# Patient Record
Sex: FEMALE | Race: WHITE | Employment: OTHER | URBAN - METROPOLITAN AREA
[De-identification: names, ages, dates, MRNs, and addresses within clinical notes are randomized per-mention and may not be internally consistent; named-entity substitution may affect disease eponyms.]

---

## 2017-11-30 ENCOUNTER — HOSPITAL ENCOUNTER (OUTPATIENT)
Dept: PHYSICAL THERAPY | Facility: CLINIC | Age: 62
Setting detail: THERAPIES SERIES
End: 2017-11-30
Attending: ORTHOPAEDIC SURGERY
Payer: COMMERCIAL

## 2017-11-30 PROCEDURE — 40000718 ZZHC STATISTIC PT DEPARTMENT ORTHO VISIT: Performed by: PHYSICAL THERAPIST

## 2017-11-30 PROCEDURE — 97140 MANUAL THERAPY 1/> REGIONS: CPT | Mod: GP | Performed by: PHYSICAL THERAPIST

## 2017-11-30 PROCEDURE — 97161 PT EVAL LOW COMPLEX 20 MIN: CPT | Mod: GP | Performed by: PHYSICAL THERAPIST

## 2017-11-30 NOTE — PROGRESS NOTES
PHYSICAL THERAPY INITIAL EVALUATION  11/30/17 1400   General Information   Type of Visit Initial OP Ortho PT Evaluation   Start of Care Date 11/30/17   Referring Physician Dr. Silva    Patient/Family Goals Statement being able to freely bring her right leg up    Orders Evaluate and Treat   Orders Comment isometric core strengthening for low back pain. 12 visits.    Date of Order 11/15/17   Insurance Type Private   Insurance Comments/Visits Authorized [employer]   Medical Diagnosis right trochanteric bursitis and back pain   Surgical/Medical history reviewed Yes   Precautions/Limitations no known precautions/limitations   Body Part(s)   Body Part(s) Lumbar Spine/SI   Presentation and Etiology   Pertinent history of current problem (include personal factors and/or comorbidities that impact the POC) Patient reports maybe 4-5 years ago had bent backwards and twisted and felt a pop. Took about a year to get better. Maybe 2 years ago she was bending over weeding repetively and ended up with sciatica really bad. Recently, when doing yoga and she can't lift the right leg up high, same as when trying to horseback ride,can't lay on R side. Had injection into the lumbar spine a few weeks ago.    Impairments A. Pain;E. Decreased flexibility;G. Impaired balance;K. Numbness;L. Tingling;M. Locking or catching   Functional Limitations perform activities of daily living;perform required work activities;perform desired leisure / sports activities   Symptom Location R lateral hip, central low back   How/Where did it occur With repetition/overuse   Onset date of current episode/exacerbation 11/15/17   Chronicity Recurrent   Pain rating (0-10 point scale) Best (/10);Worst (/10)   Best (/10) 3   Worst (/10) 5   Pain quality A. Sharp   Frequency of pain/symptoms A. Constant   Pain/symptoms exacerbated by A. Sitting;B. Walking;G. Certain positions   Pain/symptoms eased by C. Rest;E. Changing positions;H. Cold;I. OTC medication(s)  "  Progression of symptoms since onset: Unchanged   Prior Level of Function   Prior Level of Function-Mobility independent   Prior Level of Function-ADLs independent   Current Level of Function   Patient role/employment history A. 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 No   Have you fallen and had an injury in the past year? No   Is patient a fall risk? No   System Outcome Measures   Outcome Measures Low Back Pain (see Oswestry and Ravindra)   Lumbar Spine/SI Objective Findings   Gait/Locomotion antalgic   Flexion ROM fingertips to mid shin with pulling B back   Extension ROM 25%, \"felt god\"   Right Side Bending ROM Fingeritps to superior pole of patella, pulling mid back   Left Side Bending ROM Fingeritps to superior pole of patella, pulling mid back   Pelvic Screen + supine to sit, - SI provocation testing   Hip Screen Hip ROM WNL, FADIR R caused lateral hip pain, - JOSE JUAN, scour caused pain in posterior buttock   Hip Flexion (L2) Strength R 4-/5 pain lateral hip, L 5/5      Hip Abduction Strength R 4-/5 pain lateral hip L 4/5   Knee Flexion Strength 5/5   Knee Extension (L3) Strength 5/5   Ankle Dorsiflexion (L4) Strength 5/5   Great Toe Extension (L5) Strength 5/5   Ankle Plantar Flexion (S1) Strength 5/5   Hamstring Flexibility mod tight B   Hip Flexor Flexibility mild tight B   Quadricep Flexibility mod tight R   Obers Flexibility mod tight R   Piriformis Flexibility mod tight R   SLR -   Dave Test -   Ely Test + R   Segmental Mobility hypomobile mid thoracic and lower lumbar   Sensation Testing normal    Patellar Tendon Reflexes  2+   Achilles Tendon Reflexes 2+   Palpation very tender posterior greater trochanter, tender glut med and min tendons   Planned Therapy Interventions   Planned Therapy Interventions joint mobilization;manual " therapy;neuromuscular re-education;ROM;strengthening;stretching;balance training   Clinical Impression   Criteria for Skilled Therapeutic Interventions Met yes, treatment indicated   PT Diagnosis R hip pain, LBP   Influenced by the following impairments pain, decreased flexibility, decreased strength   Functional limitations due to impairments laying on R side, prolonged sitting, lifting R leg into car    Clinical Presentation Stable/Uncomplicated   Clinical Presentation Rationale symptoms unchanged   Clinical Decision Making (Complexity) Low complexity   Therapy Frequency 1 time/week   Predicted Duration of Therapy Intervention (days/wks) 8 weeks   Risk & Benefits of therapy have been explained Yes   Patient, Family & other staff in agreement with plan of care Yes   Clinical Impression Comments Patient presenting with signs and symptoms consistent with R greater trochanteric bursitis and mechanical back pain.   Education Assessment   Preferred Learning Style Listening;Demonstration;Pictures/video   Barriers to Learning No barriers   ORTHO GOALS   PT Ortho Eval Goals 1;2;3;4   Ortho Goal 1   Goal Identifier 1   Goal Description Patinet will be able to get in and out of car with minimal pain or difficulty.   Target Date 01/25/18   Ortho Goal 2   Goal Identifier 2   Goal Description Patient will be able to lay on R side with minimal pain.   Target Date 01/25/18   Ortho Goal 3   Goal Identifier 3   Goal Description Patient will be able to sit > 1 hour with minimal pain.   Target Date 01/25/18   Ortho Goal 4   Goal Identifier 4   Goal Description Patient will be independent and compliant with HEP to aid functional recovery.   Target Date 01/25/18   Total Evaluation Time   Total Evaluation Time 20       Please contact me with any questions or concerns.  Thank you for your referral.    Nayely Valencia, PT, DPT, OCS  Physical Therapist, Orthopedic Certified Specialist  [facility]  [facility]  [phone]

## 2017-12-02 ENCOUNTER — HEALTH MAINTENANCE LETTER (OUTPATIENT)
Age: 62
End: 2017-12-02

## 2017-12-07 ENCOUNTER — HOSPITAL ENCOUNTER (OUTPATIENT)
Dept: MAMMOGRAPHY | Facility: CLINIC | Age: 62
Discharge: HOME | End: 2017-12-07
Attending: FAMILY MEDICINE | Admitting: FAMILY MEDICINE
Payer: COMMERCIAL

## 2017-12-07 ENCOUNTER — HOSPITAL ENCOUNTER (OUTPATIENT)
Dept: PHYSICAL THERAPY | Facility: CLINIC | Age: 62
Setting detail: THERAPIES SERIES
End: 2017-12-07
Attending: ORTHOPAEDIC SURGERY
Payer: COMMERCIAL

## 2017-12-07 DIAGNOSIS — R92.8 ABNORMAL MAMMOGRAM: ICD-10-CM

## 2017-12-07 PROCEDURE — 97110 THERAPEUTIC EXERCISES: CPT | Mod: GP | Performed by: PHYSICAL THERAPIST

## 2017-12-07 PROCEDURE — 97140 MANUAL THERAPY 1/> REGIONS: CPT | Mod: GP | Performed by: PHYSICAL THERAPIST

## 2017-12-07 PROCEDURE — G0279 TOMOSYNTHESIS, MAMMO: HCPCS

## 2017-12-07 PROCEDURE — 40000718 ZZHC STATISTIC PT DEPARTMENT ORTHO VISIT: Performed by: PHYSICAL THERAPIST

## 2017-12-14 ENCOUNTER — HOSPITAL ENCOUNTER (OUTPATIENT)
Dept: PHYSICAL THERAPY | Facility: CLINIC | Age: 62
Setting detail: THERAPIES SERIES
End: 2017-12-14
Attending: ORTHOPAEDIC SURGERY
Payer: COMMERCIAL

## 2017-12-14 PROCEDURE — 40000718 ZZHC STATISTIC PT DEPARTMENT ORTHO VISIT: Performed by: PHYSICAL THERAPIST

## 2017-12-14 PROCEDURE — 97140 MANUAL THERAPY 1/> REGIONS: CPT | Mod: GP | Performed by: PHYSICAL THERAPIST

## 2017-12-14 PROCEDURE — 97110 THERAPEUTIC EXERCISES: CPT | Mod: GP | Performed by: PHYSICAL THERAPIST

## 2017-12-21 ENCOUNTER — HOSPITAL ENCOUNTER (OUTPATIENT)
Dept: PHYSICAL THERAPY | Facility: CLINIC | Age: 62
Setting detail: THERAPIES SERIES
End: 2017-12-21
Attending: ORTHOPAEDIC SURGERY
Payer: COMMERCIAL

## 2017-12-21 PROCEDURE — 97140 MANUAL THERAPY 1/> REGIONS: CPT | Mod: GP | Performed by: PHYSICAL THERAPIST

## 2017-12-21 PROCEDURE — 97110 THERAPEUTIC EXERCISES: CPT | Mod: GP | Performed by: PHYSICAL THERAPIST

## 2017-12-21 PROCEDURE — 40000718 ZZHC STATISTIC PT DEPARTMENT ORTHO VISIT: Performed by: PHYSICAL THERAPIST

## 2017-12-21 NOTE — ADDENDUM NOTE
Encounter addended by: Nayely Valencia PT on: 12/21/2017 10:14 AM<BR>     Actions taken: Flowsheet accepted

## 2017-12-27 ENCOUNTER — HOSPITAL ENCOUNTER (OUTPATIENT)
Dept: PHYSICAL THERAPY | Facility: CLINIC | Age: 62
Setting detail: THERAPIES SERIES
End: 2017-12-27
Attending: ORTHOPAEDIC SURGERY
Payer: COMMERCIAL

## 2017-12-27 PROCEDURE — 40000718 ZZHC STATISTIC PT DEPARTMENT ORTHO VISIT: Performed by: PHYSICAL THERAPIST

## 2017-12-27 PROCEDURE — 97110 THERAPEUTIC EXERCISES: CPT | Mod: GP | Performed by: PHYSICAL THERAPIST

## 2017-12-27 PROCEDURE — 97140 MANUAL THERAPY 1/> REGIONS: CPT | Mod: GP | Performed by: PHYSICAL THERAPIST

## 2018-01-03 ENCOUNTER — HOSPITAL ENCOUNTER (OUTPATIENT)
Dept: PHYSICAL THERAPY | Facility: CLINIC | Age: 63
Setting detail: THERAPIES SERIES
End: 2018-01-03
Attending: ORTHOPAEDIC SURGERY
Payer: COMMERCIAL

## 2018-01-03 PROCEDURE — 40000718 ZZHC STATISTIC PT DEPARTMENT ORTHO VISIT: Performed by: PHYSICAL THERAPIST

## 2018-01-03 PROCEDURE — 97140 MANUAL THERAPY 1/> REGIONS: CPT | Mod: GP | Performed by: PHYSICAL THERAPIST

## 2018-01-10 ENCOUNTER — HOSPITAL ENCOUNTER (OUTPATIENT)
Dept: PHYSICAL THERAPY | Facility: CLINIC | Age: 63
Setting detail: THERAPIES SERIES
End: 2018-01-10
Attending: ORTHOPAEDIC SURGERY
Payer: COMMERCIAL

## 2018-01-10 PROCEDURE — 40000718 ZZHC STATISTIC PT DEPARTMENT ORTHO VISIT: Performed by: PHYSICAL THERAPIST

## 2018-01-10 PROCEDURE — 97140 MANUAL THERAPY 1/> REGIONS: CPT | Mod: GP | Performed by: PHYSICAL THERAPIST

## 2018-01-18 ENCOUNTER — HOSPITAL ENCOUNTER (OUTPATIENT)
Dept: PHYSICAL THERAPY | Facility: CLINIC | Age: 63
Setting detail: THERAPIES SERIES
End: 2018-01-18
Attending: ORTHOPAEDIC SURGERY
Payer: COMMERCIAL

## 2018-01-18 PROCEDURE — 40000718 ZZHC STATISTIC PT DEPARTMENT ORTHO VISIT

## 2018-01-18 PROCEDURE — 97140 MANUAL THERAPY 1/> REGIONS: CPT | Mod: GP

## 2018-01-26 ENCOUNTER — HOSPITAL ENCOUNTER (OUTPATIENT)
Dept: PHYSICAL THERAPY | Facility: CLINIC | Age: 63
Setting detail: THERAPIES SERIES
End: 2018-01-26
Attending: ORTHOPAEDIC SURGERY
Payer: COMMERCIAL

## 2018-01-26 PROCEDURE — 97140 MANUAL THERAPY 1/> REGIONS: CPT | Mod: GP | Performed by: PHYSICAL THERAPIST

## 2018-01-26 PROCEDURE — 40000718 ZZHC STATISTIC PT DEPARTMENT ORTHO VISIT: Performed by: PHYSICAL THERAPIST

## 2018-02-01 ENCOUNTER — HOSPITAL ENCOUNTER (OUTPATIENT)
Dept: PHYSICAL THERAPY | Facility: CLINIC | Age: 63
Setting detail: THERAPIES SERIES
End: 2018-02-01
Attending: ORTHOPAEDIC SURGERY
Payer: COMMERCIAL

## 2018-02-01 PROCEDURE — 97110 THERAPEUTIC EXERCISES: CPT | Mod: GP | Performed by: PHYSICAL THERAPIST

## 2018-02-01 PROCEDURE — 40000718 ZZHC STATISTIC PT DEPARTMENT ORTHO VISIT: Performed by: PHYSICAL THERAPIST

## 2018-02-01 PROCEDURE — 97140 MANUAL THERAPY 1/> REGIONS: CPT | Mod: GP | Performed by: PHYSICAL THERAPIST

## 2018-02-01 NOTE — PROGRESS NOTES
PHYSICAL THERAPY PROGRESS NOTE  02/01/18 0900   Signing Clinician's Name / Credentials   Signing clinician's name / credentials Nayely Valencia, PT, DPT, OCS   Session Number   Session Number 10 medica   Progress Note/Recertification   Progress Note Due Date 03/29/18   Adult Goals   PT Ortho Eval Goals 1;2;3;4   Ortho Goal 1   Goal Identifier 1   Goal Description Patinet will be able to get in and out of car with minimal pain or difficulty.   Target Date 01/25/18   Date Met 01/18/18   Ortho Goal 2   Goal Identifier 2   Goal Description Patient will be able to lay on R side with minimal pain.   Target Date 01/25/18   Date Met 01/18/18   Ortho Goal 3   Goal Identifier 3   Goal Description Patient will be able to sit > 1 hour with minimal pain.   Target Date 03/29/18   Date Met (up to an hour)   Ortho Goal 4   Goal Identifier 4   Goal Description Patient will be independent and compliant with HEP to aid functional recovery.   Target Date 03/29/18   Date Met (compliant, continuing to progress)   Subjective Report   Subjective Report Patient reports she continues to slowly improve.    Objective Measures   Objective Measures Objective Measure 1   Objective Measure 1   Objective Measure Palpation   Details B glute tendons minimally tender now    Treatment Interventions   Interventions Therapeutic Procedure/Exercise;Manual Therapy   Therapeutic Procedure/exercise   Minutes 8   Skilled Intervention stretching to decrease pain and improve function   Patient Response felt good   Treatment Detail re-instr pt in performing SL reach and roll for thoracic mobility x 10 B and hookling lower trunk rotation stretch B   Manual Therapy   Minutes 22   Skilled Intervention MT to improve mobility and decrease pain    Patient Response less tender glute tendons B, still very tight and tender piriformis, stiff TL junction   Treatment Detail CFM glute med/min tendons B, TPR piriformis B. MET ERS L L1, prone PA T11-L1 grade III     Assessments Completed   Assessments Completed The patient's hip pain and significantly improved and she no longer has issues raising her leg up into the car on laying on her hips. Her main compliant now continues to be her back pain, especially with sitting. She would continue to benefit from skilled PT to focus more on her back pain.   Education   Learner Patient   Readiness Acceptance   Method Booklet/handout;Explanation;Demonstration   Response Verbalizes Understanding;Demonstrates Understanding   Plan   Home program above ex   Updates to plan of care 1x/week for 4 weeks, 1x every other week for 4 weeks   Plan for next session focus on back (TL junction), core strength   Total Session Time   Timed Code Treatment Minutes 30   Total Treatment Time (sum of timed and untimed services) 30, te mt       Please contact me with any questions or concerns.  Thank you for your referral.    Nayely Valencia, PT, DPT, OCS  Physical Therapist, Orthopedic Certified Specialist  [facility] - [facility]  [phone]

## 2018-02-08 ENCOUNTER — HOSPITAL ENCOUNTER (OUTPATIENT)
Dept: PHYSICAL THERAPY | Facility: CLINIC | Age: 63
Setting detail: THERAPIES SERIES
End: 2018-02-08
Attending: ORTHOPAEDIC SURGERY
Payer: COMMERCIAL

## 2018-02-08 PROCEDURE — 97140 MANUAL THERAPY 1/> REGIONS: CPT | Mod: GP | Performed by: PHYSICAL THERAPIST

## 2018-02-08 PROCEDURE — 40000718 ZZHC STATISTIC PT DEPARTMENT ORTHO VISIT: Performed by: PHYSICAL THERAPIST

## 2018-02-16 ENCOUNTER — HOSPITAL ENCOUNTER (OUTPATIENT)
Dept: PHYSICAL THERAPY | Facility: CLINIC | Age: 63
Setting detail: THERAPIES SERIES
End: 2018-02-16
Attending: ORTHOPAEDIC SURGERY
Payer: COMMERCIAL

## 2018-02-16 PROCEDURE — 97110 THERAPEUTIC EXERCISES: CPT | Mod: GP | Performed by: PHYSICAL THERAPIST

## 2018-02-16 PROCEDURE — 40000718 ZZHC STATISTIC PT DEPARTMENT ORTHO VISIT: Performed by: PHYSICAL THERAPIST

## 2018-02-16 PROCEDURE — 97140 MANUAL THERAPY 1/> REGIONS: CPT | Mod: GP | Performed by: PHYSICAL THERAPIST

## 2018-02-23 ENCOUNTER — HOSPITAL ENCOUNTER (OUTPATIENT)
Dept: PHYSICAL THERAPY | Facility: CLINIC | Age: 63
Setting detail: THERAPIES SERIES
End: 2018-02-23
Attending: ORTHOPAEDIC SURGERY
Payer: COMMERCIAL

## 2018-02-23 PROCEDURE — 40000718 ZZHC STATISTIC PT DEPARTMENT ORTHO VISIT: Performed by: PHYSICAL THERAPIST

## 2018-02-23 PROCEDURE — 97140 MANUAL THERAPY 1/> REGIONS: CPT | Mod: GP | Performed by: PHYSICAL THERAPIST

## 2018-02-23 PROCEDURE — 97110 THERAPEUTIC EXERCISES: CPT | Mod: GP | Performed by: PHYSICAL THERAPIST

## 2018-07-09 NOTE — PROGRESS NOTES
PHYSICAL THERAPY DISCHARGE NOTE  02/23/18 0900   Signing Clinician's Name / Credentials   Signing clinician's name / credentials Nayely Valencia, PT, DPT, OCS   Session Number   Session Number 13 medica   Progress Note/Recertification   Progress Note Due Date 03/29/18   Adult Goals   PT Ortho Eval Goals 1;2;3;4   Ortho Goal 1   Goal Identifier 1   Goal Description Patinet will be able to get in and out of car with minimal pain or difficulty.   Target Date 01/25/18   Date Met 01/18/18   Ortho Goal 2   Goal Identifier 2   Goal Description Patient will be able to lay on R side with minimal pain.   Target Date 01/25/18   Date Met 01/18/18   Ortho Goal 3   Goal Identifier 3   Goal Description Patient will be able to sit > 1 hour with minimal pain.   Target Date 03/29/18   Date Met (up to an hour)   Ortho Goal 4   Goal Identifier 4   Goal Description Patient will be independent and compliant with HEP to aid functional recovery.   Target Date 03/29/18   Date Met (compliant, continuing to progress)   Subjective Report   Subjective Report Patient reports she feels good as long as she does her exercises.   Objective Measures   Objective Measures Objective Measure 1   Objective Measure 1   Objective Measure Palpation   Treatment Interventions   Interventions Therapeutic Procedure/Exercise;Manual Therapy   Therapeutic Procedure/exercise   Minutes 10   Skilled Intervention strengthening to decrease pain and improve function   Patient Response glutes fatigued out quickly   Treatment Detail hip hike on step with hip abduction (hike-hold hike and kick out leg, bring back-relax) x 20 B, SL clamshell RTB x 20 B,  supported on counter donkey kick x 20 B.   Manual Therapy   Minutes 15   Skilled Intervention MT to improve mobility and decrease pain    Patient Response decreased tissue tension, felt better   Treatment Detail TPR B pirifromis, along iliac crest B for glutes, along sacrum    Education   Learner Patient   Readiness  Acceptance   Method Booklet/handout;Explanation;Demonstration   Response Verbalizes Understanding;Demonstrates Understanding   Plan   Home program above ex   Updates to plan of care pt would like to cont ex on own   Plan for next session hold chart    Total Session Time   Timed Code Treatment Minutes 25   Total Treatment Time (sum of timed and untimed services) 25, te mt       Please contact me with any questions or concerns.  Thank you for your referral.    Nayely Valencia, PT, DPT, OCS  Physical Therapist, Orthopedic Certified Specialist  [facility] - [facility]  [phone]

## 2018-07-09 NOTE — ADDENDUM NOTE
Encounter addended by: Nayely Valencia, PT on: 7/9/2018  1:33 PM<BR>     Actions taken: Sign clinical note, Flowsheet accepted, Episode resolved

## 2019-04-04 ENCOUNTER — HOSPITAL ENCOUNTER (OUTPATIENT)
Dept: MAMMOGRAPHY | Facility: CLINIC | Age: 64
Discharge: HOME | End: 2019-04-04
Attending: FAMILY MEDICINE | Admitting: FAMILY MEDICINE
Payer: COMMERCIAL

## 2019-04-04 DIAGNOSIS — Z12.31 VISIT FOR SCREENING MAMMOGRAM: ICD-10-CM

## 2019-04-04 PROCEDURE — 77063 BREAST TOMOSYNTHESIS BI: CPT

## 2020-08-12 DIAGNOSIS — Z11.59 ENCOUNTER FOR SCREENING FOR OTHER VIRAL DISEASES: Primary | ICD-10-CM

## 2020-08-13 ENCOUNTER — HOSPITAL ENCOUNTER (OUTPATIENT)
Dept: MAMMOGRAPHY | Facility: CLINIC | Age: 65
Discharge: HOME | End: 2020-08-13
Attending: FAMILY MEDICINE | Admitting: FAMILY MEDICINE
Payer: COMMERCIAL

## 2020-08-13 DIAGNOSIS — Z12.31 VISIT FOR SCREENING MAMMOGRAM: ICD-10-CM

## 2020-08-13 PROCEDURE — 77067 SCR MAMMO BI INCL CAD: CPT

## 2020-09-30 ENCOUNTER — ANESTHESIA EVENT (OUTPATIENT)
Dept: SURGERY | Facility: CLINIC | Age: 65
End: 2020-09-30
Payer: COMMERCIAL

## 2020-10-02 ENCOUNTER — AMBULATORY - HEALTHEAST (OUTPATIENT)
Dept: INTERNAL MEDICINE | Facility: CLINIC | Age: 65
End: 2020-10-02

## 2020-10-02 DIAGNOSIS — Z11.59 ENCOUNTER FOR SCREENING FOR OTHER VIRAL DISEASES: ICD-10-CM

## 2020-10-02 RX ORDER — CETIRIZINE HYDROCHLORIDE 10 MG/1
10 TABLET ORAL DAILY
COMMUNITY

## 2020-10-03 ENCOUNTER — AMBULATORY - HEALTHEAST (OUTPATIENT)
Dept: FAMILY MEDICINE | Facility: CLINIC | Age: 65
End: 2020-10-03

## 2020-10-03 DIAGNOSIS — Z11.59 ENCOUNTER FOR SCREENING FOR OTHER VIRAL DISEASES: ICD-10-CM

## 2020-10-05 ENCOUNTER — COMMUNICATION - HEALTHEAST (OUTPATIENT)
Dept: SCHEDULING | Facility: CLINIC | Age: 65
End: 2020-10-05

## 2020-10-06 ENCOUNTER — APPOINTMENT (OUTPATIENT)
Dept: GENERAL RADIOLOGY | Facility: CLINIC | Age: 65
End: 2020-10-06
Attending: ORTHOPAEDIC SURGERY
Payer: COMMERCIAL

## 2020-10-06 ENCOUNTER — HOSPITAL ENCOUNTER (OUTPATIENT)
Facility: CLINIC | Age: 65
LOS: 1 days | Discharge: HOME | End: 2020-10-07
Attending: ORTHOPAEDIC SURGERY | Admitting: ORTHOPAEDIC SURGERY
Payer: COMMERCIAL

## 2020-10-06 ENCOUNTER — ANESTHESIA (OUTPATIENT)
Dept: SURGERY | Facility: CLINIC | Age: 65
End: 2020-10-06
Payer: COMMERCIAL

## 2020-10-06 DIAGNOSIS — Z98.890 S/P LUMBAR LAMINECTOMY: Primary | ICD-10-CM

## 2020-10-06 PROCEDURE — 250N000011 HC RX IP 250 OP 636: Performed by: NURSE ANESTHETIST, CERTIFIED REGISTERED

## 2020-10-06 PROCEDURE — 370N000001 HC ANESTHESIA TECHNICAL FEE, 1ST 30 MIN: Performed by: ORTHOPAEDIC SURGERY

## 2020-10-06 PROCEDURE — 360N000030 HC SURGERY LEVEL 4 W FLUORO 1ST 30 MIN: Performed by: ORTHOPAEDIC SURGERY

## 2020-10-06 PROCEDURE — C1763 CONN TISS, NON-HUMAN: HCPCS | Performed by: ORTHOPAEDIC SURGERY

## 2020-10-06 PROCEDURE — 272N000004 HC RX 272: Performed by: ORTHOPAEDIC SURGERY

## 2020-10-06 PROCEDURE — 258N000003 HC RX IP 258 OP 636: Performed by: NURSE ANESTHETIST, CERTIFIED REGISTERED

## 2020-10-06 PROCEDURE — 250N000009 HC RX 250: Performed by: NURSE ANESTHETIST, CERTIFIED REGISTERED

## 2020-10-06 PROCEDURE — 250N000013 HC RX MED GY IP 250 OP 250 PS 637: Performed by: NURSE ANESTHETIST, CERTIFIED REGISTERED

## 2020-10-06 PROCEDURE — 250N000011 HC RX IP 250 OP 636: Performed by: ORTHOPAEDIC SURGERY

## 2020-10-06 PROCEDURE — 761N000002 HC RECOVERY PHASE 1 LEVEL 1 EA ADDTL HR: Performed by: ORTHOPAEDIC SURGERY

## 2020-10-06 PROCEDURE — 999N000179 XR SURGERY CARM FLUORO LESS THAN 5 MIN W STILLS: Mod: TC

## 2020-10-06 PROCEDURE — 250N000013 HC RX MED GY IP 250 OP 250 PS 637: Performed by: ORTHOPAEDIC SURGERY

## 2020-10-06 PROCEDURE — 761N000001 HC RECOVERY PHASE 1 LEVEL 1 FIRST HR: Performed by: ORTHOPAEDIC SURGERY

## 2020-10-06 PROCEDURE — 272N000001 HC OR GENERAL SUPPLY STERILE: Performed by: ORTHOPAEDIC SURGERY

## 2020-10-06 PROCEDURE — 999N000136 HC STATISTIC PRE PROC ASSESS II: Performed by: ORTHOPAEDIC SURGERY

## 2020-10-06 PROCEDURE — 370N000002 HC ANESTHESIA TECHNICAL FEE, EACH ADDTL 15 MIN: Performed by: ORTHOPAEDIC SURGERY

## 2020-10-06 PROCEDURE — 360N000027 HC SURGERY LEVEL 4 EA 15 ADDTL MIN: Performed by: ORTHOPAEDIC SURGERY

## 2020-10-06 PROCEDURE — 271N000001 HC OR GENERAL SUPPLY NON-STERILE: Performed by: ORTHOPAEDIC SURGERY

## 2020-10-06 DEVICE — GRAFT DURAGEN MATRIX 1"X1": Type: IMPLANTABLE DEVICE | Site: SPINE LUMBAR | Status: FUNCTIONAL

## 2020-10-06 RX ORDER — LEVOTHYROXINE SODIUM 50 UG/1
50 TABLET ORAL ONCE
Status: COMPLETED | OUTPATIENT
Start: 2020-10-07 | End: 2020-10-06

## 2020-10-06 RX ORDER — GABAPENTIN 300 MG/1
300 CAPSULE ORAL 2 TIMES DAILY
Status: DISCONTINUED | OUTPATIENT
Start: 2020-10-06 | End: 2020-10-07

## 2020-10-06 RX ORDER — ESTRADIOL 1 MG/1
1 TABLET ORAL DAILY
Status: DISCONTINUED | OUTPATIENT
Start: 2020-10-06 | End: 2020-10-07 | Stop reason: HOSPADM

## 2020-10-06 RX ORDER — HYDROXYZINE HYDROCHLORIDE 25 MG/1
25 TABLET, FILM COATED ORAL EVERY 6 HOURS PRN
Status: DISCONTINUED | OUTPATIENT
Start: 2020-10-06 | End: 2020-10-07 | Stop reason: HOSPADM

## 2020-10-06 RX ORDER — MEPERIDINE HYDROCHLORIDE 25 MG/ML
12.5 INJECTION INTRAMUSCULAR; INTRAVENOUS; SUBCUTANEOUS EVERY 5 MIN PRN
Status: DISCONTINUED | OUTPATIENT
Start: 2020-10-06 | End: 2020-10-07 | Stop reason: HOSPADM

## 2020-10-06 RX ORDER — HYDROCODONE BITARTRATE AND ACETAMINOPHEN 5; 325 MG/1; MG/1
1-2 TABLET ORAL EVERY 4 HOURS PRN
Status: DISCONTINUED | OUTPATIENT
Start: 2020-10-06 | End: 2020-10-07 | Stop reason: HOSPADM

## 2020-10-06 RX ORDER — GABAPENTIN 300 MG/1
300 CAPSULE ORAL ONCE
Status: COMPLETED | OUTPATIENT
Start: 2020-10-06 | End: 2020-10-06

## 2020-10-06 RX ORDER — ACETAMINOPHEN 325 MG/1
975 TABLET ORAL ONCE
Status: DISCONTINUED | OUTPATIENT
Start: 2020-10-06 | End: 2020-10-06

## 2020-10-06 RX ORDER — TRAMADOL HYDROCHLORIDE 50 MG/1
1 TABLET ORAL DAILY
Status: ON HOLD | COMMUNITY
Start: 2020-08-25 | End: 2020-10-07

## 2020-10-06 RX ORDER — CELECOXIB 200 MG/1
200 CAPSULE ORAL ONCE
Status: COMPLETED | OUTPATIENT
Start: 2020-10-06 | End: 2020-10-06

## 2020-10-06 RX ORDER — CALCIUM CARBONATE 500 MG/1
1000 TABLET, CHEWABLE ORAL 4 TIMES DAILY PRN
Status: DISCONTINUED | OUTPATIENT
Start: 2020-10-06 | End: 2020-10-07 | Stop reason: HOSPADM

## 2020-10-06 RX ORDER — DEXAMETHASONE SODIUM PHOSPHATE 4 MG/ML
INJECTION, SOLUTION INTRA-ARTICULAR; INTRALESIONAL; INTRAMUSCULAR; INTRAVENOUS; SOFT TISSUE PRN
Status: DISCONTINUED | OUTPATIENT
Start: 2020-10-06 | End: 2020-10-06

## 2020-10-06 RX ORDER — NALOXONE HYDROCHLORIDE 0.4 MG/ML
.1-.4 INJECTION, SOLUTION INTRAMUSCULAR; INTRAVENOUS; SUBCUTANEOUS
Status: ACTIVE | OUTPATIENT
Start: 2020-10-06 | End: 2020-10-07

## 2020-10-06 RX ORDER — MAGNESIUM SULFATE HEPTAHYDRATE 40 MG/ML
2 INJECTION, SOLUTION INTRAVENOUS ONCE
Status: COMPLETED | OUTPATIENT
Start: 2020-10-06 | End: 2020-10-06

## 2020-10-06 RX ORDER — CEFAZOLIN SODIUM 1 G/50ML
1 INJECTION, SOLUTION INTRAVENOUS EVERY 8 HOURS
Status: COMPLETED | OUTPATIENT
Start: 2020-10-06 | End: 2020-10-07

## 2020-10-06 RX ORDER — HYDROMORPHONE HYDROCHLORIDE 1 MG/ML
.3-.5 INJECTION, SOLUTION INTRAMUSCULAR; INTRAVENOUS; SUBCUTANEOUS EVERY 5 MIN PRN
Status: DISCONTINUED | OUTPATIENT
Start: 2020-10-06 | End: 2020-10-07 | Stop reason: HOSPADM

## 2020-10-06 RX ORDER — METOCLOPRAMIDE 10 MG/1
10 TABLET ORAL EVERY 6 HOURS PRN
Status: DISCONTINUED | OUTPATIENT
Start: 2020-10-06 | End: 2020-10-07 | Stop reason: HOSPADM

## 2020-10-06 RX ORDER — KETAMINE HYDROCHLORIDE 10 MG/ML
0.25 INJECTION, SOLUTION INTRAMUSCULAR; INTRAVENOUS ONCE
Status: DISCONTINUED | OUTPATIENT
Start: 2020-10-06 | End: 2020-10-06 | Stop reason: HOSPADM

## 2020-10-06 RX ORDER — SODIUM CHLORIDE, SODIUM LACTATE, POTASSIUM CHLORIDE, CALCIUM CHLORIDE 600; 310; 30; 20 MG/100ML; MG/100ML; MG/100ML; MG/100ML
INJECTION, SOLUTION INTRAVENOUS CONTINUOUS
Status: DISCONTINUED | OUTPATIENT
Start: 2020-10-06 | End: 2020-10-06 | Stop reason: HOSPADM

## 2020-10-06 RX ORDER — ACETAMINOPHEN 325 MG/1
975 TABLET ORAL ONCE
Status: COMPLETED | OUTPATIENT
Start: 2020-10-06 | End: 2020-10-06

## 2020-10-06 RX ORDER — SODIUM CHLORIDE, SODIUM LACTATE, POTASSIUM CHLORIDE, CALCIUM CHLORIDE 600; 310; 30; 20 MG/100ML; MG/100ML; MG/100ML; MG/100ML
INJECTION, SOLUTION INTRAVENOUS CONTINUOUS
Status: DISCONTINUED | OUTPATIENT
Start: 2020-10-06 | End: 2020-10-07 | Stop reason: HOSPADM

## 2020-10-06 RX ORDER — HYDROMORPHONE HYDROCHLORIDE 1 MG/ML
0.2 INJECTION, SOLUTION INTRAMUSCULAR; INTRAVENOUS; SUBCUTANEOUS
Status: DISCONTINUED | OUTPATIENT
Start: 2020-10-06 | End: 2020-10-07 | Stop reason: HOSPADM

## 2020-10-06 RX ORDER — PROPOFOL 10 MG/ML
INJECTION, EMULSION INTRAVENOUS CONTINUOUS PRN
Status: DISCONTINUED | OUTPATIENT
Start: 2020-10-06 | End: 2020-10-06

## 2020-10-06 RX ORDER — PROPOFOL 10 MG/ML
INJECTION, EMULSION INTRAVENOUS
Status: COMPLETED | OUTPATIENT
Start: 2020-10-06 | End: 2020-10-06

## 2020-10-06 RX ORDER — CETIRIZINE HYDROCHLORIDE 10 MG/1
10 TABLET ORAL DAILY
Status: DISCONTINUED | OUTPATIENT
Start: 2020-10-07 | End: 2020-10-07 | Stop reason: HOSPADM

## 2020-10-06 RX ORDER — FENTANYL CITRATE 50 UG/ML
INJECTION, SOLUTION INTRAMUSCULAR; INTRAVENOUS PRN
Status: DISCONTINUED | OUTPATIENT
Start: 2020-10-06 | End: 2020-10-06

## 2020-10-06 RX ORDER — HYDRALAZINE HYDROCHLORIDE 20 MG/ML
2.5-5 INJECTION INTRAMUSCULAR; INTRAVENOUS EVERY 10 MIN PRN
Status: DISCONTINUED | OUTPATIENT
Start: 2020-10-06 | End: 2020-10-07 | Stop reason: HOSPADM

## 2020-10-06 RX ORDER — PROPOFOL 10 MG/ML
INJECTION, EMULSION INTRAVENOUS PRN
Status: DISCONTINUED | OUTPATIENT
Start: 2020-10-06 | End: 2020-10-06

## 2020-10-06 RX ORDER — CEFAZOLIN SODIUM 1 G/3ML
1 INJECTION, POWDER, FOR SOLUTION INTRAMUSCULAR; INTRAVENOUS SEE ADMIN INSTRUCTIONS
Status: DISCONTINUED | OUTPATIENT
Start: 2020-10-06 | End: 2020-10-06 | Stop reason: HOSPADM

## 2020-10-06 RX ORDER — BUPIVACAINE HYDROCHLORIDE 2.5 MG/ML
INJECTION, SOLUTION INFILTRATION; PERINEURAL PRN
Status: DISCONTINUED | OUTPATIENT
Start: 2020-10-06 | End: 2020-10-06 | Stop reason: HOSPADM

## 2020-10-06 RX ORDER — LIDOCAINE 40 MG/G
CREAM TOPICAL
Status: DISCONTINUED | OUTPATIENT
Start: 2020-10-06 | End: 2020-10-06 | Stop reason: HOSPADM

## 2020-10-06 RX ORDER — KETOROLAC TROMETHAMINE 30 MG/ML
30 INJECTION, SOLUTION INTRAMUSCULAR; INTRAVENOUS
Status: CANCELLED | OUTPATIENT
Start: 2020-10-06

## 2020-10-06 RX ORDER — PROCHLORPERAZINE MALEATE 5 MG
10 TABLET ORAL EVERY 6 HOURS PRN
Status: DISCONTINUED | OUTPATIENT
Start: 2020-10-06 | End: 2020-10-07 | Stop reason: HOSPADM

## 2020-10-06 RX ORDER — LIDOCAINE 40 MG/G
CREAM TOPICAL
Status: DISCONTINUED | OUTPATIENT
Start: 2020-10-06 | End: 2020-10-07 | Stop reason: HOSPADM

## 2020-10-06 RX ORDER — CEFAZOLIN SODIUM 2 G/100ML
2 INJECTION, SOLUTION INTRAVENOUS
Status: COMPLETED | OUTPATIENT
Start: 2020-10-06 | End: 2020-10-06

## 2020-10-06 RX ORDER — TRAZODONE HYDROCHLORIDE 100 MG/1
2 TABLET ORAL AT BEDTIME
COMMUNITY
Start: 2020-06-18

## 2020-10-06 RX ORDER — KETAMINE HYDROCHLORIDE 10 MG/ML
INJECTION, SOLUTION INTRAMUSCULAR; INTRAVENOUS PRN
Status: DISCONTINUED | OUTPATIENT
Start: 2020-10-06 | End: 2020-10-06

## 2020-10-06 RX ORDER — CITALOPRAM HYDROBROMIDE 40 MG/1
40 TABLET ORAL DAILY
Status: DISCONTINUED | OUTPATIENT
Start: 2020-10-06 | End: 2020-10-07 | Stop reason: HOSPADM

## 2020-10-06 RX ORDER — TRAZODONE HYDROCHLORIDE 50 MG/1
50 TABLET, FILM COATED ORAL
Status: DISCONTINUED | OUTPATIENT
Start: 2020-10-06 | End: 2020-10-07 | Stop reason: HOSPADM

## 2020-10-06 RX ORDER — GLYCOPYRROLATE 0.2 MG/ML
INJECTION, SOLUTION INTRAMUSCULAR; INTRAVENOUS PRN
Status: DISCONTINUED | OUTPATIENT
Start: 2020-10-06 | End: 2020-10-06

## 2020-10-06 RX ORDER — NALOXONE HYDROCHLORIDE 0.4 MG/ML
.1-.4 INJECTION, SOLUTION INTRAMUSCULAR; INTRAVENOUS; SUBCUTANEOUS
Status: DISCONTINUED | OUTPATIENT
Start: 2020-10-06 | End: 2020-10-07 | Stop reason: HOSPADM

## 2020-10-06 RX ORDER — SCOLOPAMINE TRANSDERMAL SYSTEM 1 MG/1
1 PATCH, EXTENDED RELEASE TRANSDERMAL ONCE
Status: DISCONTINUED | OUTPATIENT
Start: 2020-10-06 | End: 2020-10-06 | Stop reason: HOSPADM

## 2020-10-06 RX ORDER — FENTANYL CITRATE 50 UG/ML
25-50 INJECTION, SOLUTION INTRAMUSCULAR; INTRAVENOUS
Status: DISCONTINUED | OUTPATIENT
Start: 2020-10-06 | End: 2020-10-07 | Stop reason: HOSPADM

## 2020-10-06 RX ORDER — ESTRADIOL 10 UG/1
10 INSERT VAGINAL
COMMUNITY
Start: 2019-12-05 | End: 2021-11-03

## 2020-10-06 RX ORDER — METOCLOPRAMIDE HYDROCHLORIDE 5 MG/ML
10 INJECTION INTRAMUSCULAR; INTRAVENOUS EVERY 6 HOURS PRN
Status: DISCONTINUED | OUTPATIENT
Start: 2020-10-06 | End: 2020-10-07 | Stop reason: HOSPADM

## 2020-10-06 RX ADMIN — ROCURONIUM BROMIDE 10 MG: 10 INJECTION INTRAVENOUS at 07:43

## 2020-10-06 RX ADMIN — MIDAZOLAM HYDROCHLORIDE 1 MG: 1 INJECTION, SOLUTION INTRAMUSCULAR; INTRAVENOUS at 09:15

## 2020-10-06 RX ADMIN — SODIUM CHLORIDE, POTASSIUM CHLORIDE, SODIUM LACTATE AND CALCIUM CHLORIDE: 600; 310; 30; 20 INJECTION, SOLUTION INTRAVENOUS at 06:46

## 2020-10-06 RX ADMIN — LEVOTHYROXINE SODIUM 50 MCG: 50 TABLET ORAL at 23:16

## 2020-10-06 RX ADMIN — CEFAZOLIN SODIUM 2 G: 2 INJECTION, SOLUTION INTRAVENOUS at 07:35

## 2020-10-06 RX ADMIN — FENTANYL CITRATE 50 MCG: 50 INJECTION, SOLUTION INTRAMUSCULAR; INTRAVENOUS at 08:06

## 2020-10-06 RX ADMIN — MIDAZOLAM HYDROCHLORIDE 2 MG: 1 INJECTION, SOLUTION INTRAMUSCULAR; INTRAVENOUS at 07:38

## 2020-10-06 RX ADMIN — GABAPENTIN 300 MG: 300 CAPSULE ORAL at 19:46

## 2020-10-06 RX ADMIN — GLYCOPYRROLATE 0.2 MG: 0.2 INJECTION, SOLUTION INTRAMUSCULAR; INTRAVENOUS at 07:42

## 2020-10-06 RX ADMIN — HYDROMORPHONE HYDROCHLORIDE 0.2 MG: 1 INJECTION, SOLUTION INTRAMUSCULAR; INTRAVENOUS; SUBCUTANEOUS at 13:57

## 2020-10-06 RX ADMIN — CEFAZOLIN SODIUM 2 G: 2 INJECTION, SOLUTION INTRAVENOUS at 09:27

## 2020-10-06 RX ADMIN — PROPOFOL 150 MG: 10 INJECTION, EMULSION INTRAVENOUS at 07:41

## 2020-10-06 RX ADMIN — LIDOCAINE HYDROCHLORIDE 50 MG: 10 INJECTION, SOLUTION EPIDURAL; INFILTRATION; INTRACAUDAL; PERINEURAL at 07:40

## 2020-10-06 RX ADMIN — CEFAZOLIN SODIUM 1 G: 1 INJECTION, SOLUTION INTRAVENOUS at 16:54

## 2020-10-06 RX ADMIN — TRAZODONE HYDROCHLORIDE 50 MG: 50 TABLET ORAL at 19:51

## 2020-10-06 RX ADMIN — SODIUM CHLORIDE, POTASSIUM CHLORIDE, SODIUM LACTATE AND CALCIUM CHLORIDE: 600; 310; 30; 20 INJECTION, SOLUTION INTRAVENOUS at 16:44

## 2020-10-06 RX ADMIN — HYDROCODONE BITARTRATE AND ACETAMINOPHEN 2 TABLET: 5; 325 TABLET ORAL at 19:46

## 2020-10-06 RX ADMIN — MAGNESIUM SULFATE 2 G: 2 INJECTION INTRAVENOUS at 06:47

## 2020-10-06 RX ADMIN — HYDROCODONE BITARTRATE AND ACETAMINOPHEN 2 TABLET: 5; 325 TABLET ORAL at 23:17

## 2020-10-06 RX ADMIN — ESTRADIOL 1 MG: 1 TABLET ORAL at 16:53

## 2020-10-06 RX ADMIN — ACETAMINOPHEN 975 MG: 325 TABLET, FILM COATED ORAL at 06:47

## 2020-10-06 RX ADMIN — PROPOFOL 150 MCG/KG/MIN: 10 INJECTION, EMULSION INTRAVENOUS at 07:43

## 2020-10-06 RX ADMIN — LIDOCAINE HYDROCHLORIDE 1 ML: 10 INJECTION, SOLUTION EPIDURAL; INFILTRATION; INTRACAUDAL; PERINEURAL at 06:47

## 2020-10-06 RX ADMIN — FENTANYL CITRATE 100 MCG: 50 INJECTION, SOLUTION INTRAMUSCULAR; INTRAVENOUS at 08:09

## 2020-10-06 RX ADMIN — PROGESTERONE 100 MG: 100 CAPSULE ORAL at 16:54

## 2020-10-06 RX ADMIN — HYDROMORPHONE HYDROCHLORIDE 1 MG: 1 INJECTION, SOLUTION INTRAMUSCULAR; INTRAVENOUS; SUBCUTANEOUS at 09:53

## 2020-10-06 RX ADMIN — CELECOXIB 200 MG: 200 CAPSULE ORAL at 06:47

## 2020-10-06 RX ADMIN — SODIUM CHLORIDE, POTASSIUM CHLORIDE, SODIUM LACTATE AND CALCIUM CHLORIDE: 600; 310; 30; 20 INJECTION, SOLUTION INTRAVENOUS at 09:27

## 2020-10-06 RX ADMIN — KETAMINE HYDROCHLORIDE 20 MG: 10 INJECTION INTRAMUSCULAR; INTRAVENOUS at 08:14

## 2020-10-06 RX ADMIN — PROPOFOL 150 MG: 10 INJECTION, EMULSION INTRAVENOUS at 07:43

## 2020-10-06 RX ADMIN — GABAPENTIN 300 MG: 300 CAPSULE ORAL at 06:47

## 2020-10-06 RX ADMIN — HYDROCODONE BITARTRATE AND ACETAMINOPHEN 1 TABLET: 5; 325 TABLET ORAL at 14:45

## 2020-10-06 RX ADMIN — MIDAZOLAM HYDROCHLORIDE 1 MG: 1 INJECTION, SOLUTION INTRAMUSCULAR; INTRAVENOUS at 10:11

## 2020-10-06 RX ADMIN — FENTANYL CITRATE 100 MCG: 50 INJECTION, SOLUTION INTRAMUSCULAR; INTRAVENOUS at 07:38

## 2020-10-06 RX ADMIN — ROCURONIUM BROMIDE 30 MG: 10 INJECTION INTRAVENOUS at 07:41

## 2020-10-06 RX ADMIN — CITALOPRAM HYDROBROMIDE 40 MG: 40 TABLET ORAL at 19:47

## 2020-10-06 RX ADMIN — FENTANYL CITRATE 100 MCG: 50 INJECTION, SOLUTION INTRAMUSCULAR; INTRAVENOUS at 10:11

## 2020-10-06 RX ADMIN — HYDROXYZINE HYDROCHLORIDE 25 MG: 25 TABLET, FILM COATED ORAL at 19:51

## 2020-10-06 RX ADMIN — HYDROMORPHONE HYDROCHLORIDE 0.2 MG: 1 INJECTION, SOLUTION INTRAMUSCULAR; INTRAVENOUS; SUBCUTANEOUS at 16:52

## 2020-10-06 RX ADMIN — MIDAZOLAM HYDROCHLORIDE 1 MG: 1 INJECTION, SOLUTION INTRAMUSCULAR; INTRAVENOUS at 08:15

## 2020-10-06 RX ADMIN — DEXAMETHASONE SODIUM PHOSPHATE 8 MG: 4 INJECTION, SOLUTION INTRA-ARTICULAR; INTRALESIONAL; INTRAMUSCULAR; INTRAVENOUS; SOFT TISSUE at 07:41

## 2020-10-06 ASSESSMENT — MIFFLIN-ST. JEOR
SCORE: 1109.74
SCORE: 1169.63

## 2020-10-06 NOTE — ANESTHESIA PROCEDURE NOTES
Airway   Date/Time: 10/6/2020 7:43 AM        Staff -   CRNA: Dania Delarosa APRN CRNA  Performed By: CRNA    Indications and Patient Condition  Indications for airway management: jaya-procedural  Induction type:intravenousMask difficulty assessment: 1 - vent by mask    Final Airway Details  Final airway type: endotracheal airway  Successful airway:ETT - single  Endotracheal Airway Details   ETT size (mm): 7.5  Cuffed: yes  Cuff volume (mL): 3  Successful intubation technique: direct laryngoscopy  Grade View of Cords: 1  Adjucts: stylet  Measured from: lips  Secured at (cm): 21  Secured with: plastic tape  Bite block used: None    Post intubation assessment   Placement verified by: capnometry, equal breath sounds, chest rise and x-ray   Number of attempts at approach: 1  Secured with:plastic tape  Ease of procedure: easy  Dentition: Intact    Medications Administered  Propofol (DIPRIVAN) injection 10 mg/mL vial, 150 mg  rocuronium (ZEMURON) 10 mg/mL injection, 10 mg

## 2020-10-06 NOTE — PROGRESS NOTES
"[facility] ADMISSION NOTE    Patient admitted to room 2309 at approximately 1300 via cart from surgery. Patient was accompanied by transport tech.     Verbal SBAR report received from Wendy prior to patient arrival.     Patient trasferred to bed via air soo. Patient alert and oriented X 3. Pain is controlled without any medications. 0-10 Pain Scale: 3. Admission vital signs: Blood pressure 123/64, pulse 73, temperature 98  F (36.7  C), temperature source Oral, resp. rate 12, height 1.499 m (4' 11\"), weight 70.4 kg (155 lb 3.3 oz), SpO2 98 %. Patient was oriented to plan of care, call light, bed controls, tv, telephone, bathroom and visiting hours.     Risk Assessment    The following safety risks were identified during admission: fall. Yellow risk band applied: YES.     Skin Initial Assessment    This writer admitted this patient and completed a full skin assessment and Senthil score in the Adult PCS flowsheet. Appropriate interventions initiated as needed.     Secondary skin check completed by Isis.    Senthil Risk Assessment  Sensory Perception: 3-->slightly limited  Moisture: 4-->rarely moist  Activity: 1-->bedfast  Mobility: 3-->slightly limited  Nutrition: 3-->adequate  Friction and Shear: 3-->no apparent problem  Senthil Score: 17  Bed Support Surface: Atmos Air mattress  Reassessed using Bed Algorithm: No  Senthil Intervention(s) Implemented: draw sheets, patient /family education on pressure injury prevention    Education    Patient has a [place] to Observation order: No  Observation education completed and documented: N/A      Marisol Farr RN    "

## 2020-10-06 NOTE — OR NURSING
Clarification per Dr. Silva: pt is on FLAT bedrest with ferris in place, logroll only, until tomorrow after pt is seen by Dr. Silva in the morning.

## 2020-10-06 NOTE — ANESTHESIA CARE TRANSFER NOTE
Patient: Shante Carrington    Procedure(s):  Lumbar 4-5 bilateral decompression, Lumbar 5- Sacral 1 bilateral lateral decompression.    Diagnosis: Lumbar disc herniation [M51.26]  Diagnosis Additional Information: No value filed.    Anesthesia Type:   General     Note:  Airway :Face Mask  Patient transferred to:PACU  Handoff Report: Identifed the Patient, Identified the Reponsible Provider, Reviewed the pertinent medical history, Discussed the surgical course, Reviewed Intra-OP anesthesia mangement and issues during anesthesia, Set expectations for post-procedure period and Allowed opportunity for questions and acknowledgement of understanding      Vitals: (Last set prior to Anesthesia Care Transfer)    CRNA VITALS  10/6/2020 1041 - 10/6/2020 1113      10/6/2020             Pulse:  100    SpO2:  97 %    Resp Rate (observed):  (!) 6                Electronically Signed By: BALA Warren CRNA  October 6, 2020  11:13 AM

## 2020-10-06 NOTE — BRIEF OP NOTE
"[facility]     Brief Operative Note    Pre-operative diagnosis: Spinal stenosis  Post-operative diagnosis Same as pre-operative diagnosis    Procedure: Procedure(s):  Lumbar 4-5 bilateral decompression, Lumbar 5- Sacral 1 bilateral lateral decompression.  Surgeon: Surgeon(s) and Role:     * Gary Silva MD - Primary  Anesthesia: General   Estimated blood loss: 50mL  Drains: Ferris catheter  Specimens: * No specimens in log *  Findings:   bilateral facet arthropathy L4-5 and L5-S1 with associated ligamentous thickening.  Complications: dural tear.  Implants:   Implant Name Type Inv. Item Serial No.  Lot No. LRB No. Used Action   GRAFT DURAGEN MATRIX 1\"X1\" Bone/Tissue/Biologic GRAFT DURAGEN MATRIX 1\"X1\"  [employer] [id number] N/A 1 Implanted     PLAN:  Admit to obs  Initially will keep on flat bedrest and keep ferris in  periop Ancef  Pain control - multimodal  Anticipate progressive HOB elevation tomorrow and if no symptoms of CSF leak will mobilize and have work with PT with restrictions (no lifting >10lbs. NO excessive bending/twisting)        "

## 2020-10-06 NOTE — INTERVAL H&P NOTE
YES I have reviewed the patient s H&P against current condition and have identified no clinically significant changes in the patient s condition.   NO I have identified significant changes in the patient s medical condition and have discussed with surgeon.     BALA Warren CRNA

## 2020-10-06 NOTE — OP NOTE
Orthopedic  Operative Note    Pre-operative diagnosis: Lumbar spinal stenosis    Post-operative diagnosis: Central and lateral recess stenosis L4-5, bilateral lateral recess L5-S1.    Procedure: 1) Bilateral decompression L4-5 with bilateral hemilaminectomy and bilateral partial medial facetectomies   2) Bilateral lateral recess decompression L5-S1 with bilateral L5 laminotomies and bilateral partial medial facetectomies   3) Intraoperative use of microscope    Surgeon: Gary Silva MD    Assistant(s): None    Anesthesia: General endotracheal anesthesia and Local anesthesia    Estimated blood loss: 50mL     Drains: Muñoz catheter    Specimens: None    Indications:                               Melanie is a pleasant 65yo F with history of spinal stenosis and R > L radiating leg pain. Also has history of mechanical back pain. Has known stenosis centrally at L4-5 and in the lateral recess at L5-S1 bilaterally. She has been seeing me for this since 2017. Eventually the pain progressed and was getting in the way of daily function and limiting her walking ability. She tried activity modification, PT, various medications and an SHIRLEY that gave her short-term good relief. She elected for surgery when none of this provided significant relief.    I discussed the risks of surgery with the patient. The risks of anesthetic being a risk of heart attack, stroke or death. The risks of surgery in general being a risk of bleeding or infection. If an infection were to occur the possibility of need to return to the OR for further operation to debride the area. The risk specific to this surgery to be the possibility of nerve root injury, permanent or temporary, with possibility for weakness, numbness/tingling or additional pain. The risk of increased back pain in the place of the surgical site. The risk of failure to alleviate symptoms. The risk of post-laminectomy instability and possibility for reoperation in the future regarding that.  After a discussion of all the risks, the patient agreed to proceed and informed consent was obtained.    Findings: Bilateral lateral recess stenosis at L4-5 and L5-S1    Complications: Intraoperative dural tear     Procedure Detail: The patient was seen in the pre-operative holding area and given a chance to ask any further questions. Informed consent was signed based on the conversation above. The low back was marked at the anticipated level with indelible ink. The patient was then brought back to the operative suite. After satisfactory general anesthesia a ferris catheter was placed and the patient was carefully placed prone onto the Adam frame.  The back was prepped and draped in the usual sterile manner with Chloraprep.  Timeout protocols were observed to identify the correct patient, correct procedure, level and that observed imaging studies were correct. I also confirmed that the patient received appropriate pre-operative prophylactic antibiotics. All were in agreement.  An 18 gauge spinal needle and its trochar were placed through the skin in midline at the anticipated level and a lateral C-arm image was taken to confirm starting point for incision.      Incision was then completed through the skin with 10 blade. Dissection with electrocautery was used to reach the paraspinal fascia.  The fascia was incised on either side of the spinous processes. Subperiosteal dissection was used to expose the presumed L4 and L5 laminae. A metallic object held over these sites and a lateral C-arm shot was taken to confirm I was at correct levels. A high speed griffin was then used to create a permanent seema in the bone at each site. The bilateral lamina of L4 and L5 were then exposed on both sides. I began at L4-5 where the McCullogh retractor was placed.     A double-action rongeur was used to remove the inferior portion of the L4 spinous process and the L4-5 interspinous ligament and the superior extent of the L5 spinous  process. Next, while using an operating microscope for visualization, a high speed Midas Wilmer drill was then used to drill the inferior lamina of L4 bilaterally and to initiate a partial medial facetectomy at L4-5 on either side. When the ventral cortex of the L4 lamina was encountered Kerrison rongeurs were used to remove that. Kerrison was used to remove the ligamentum from the caudal edge of L4. I then began to do the same from the cranial edge of the L5 lamina. As I was doing this I noticed some CSF leaking from a small 1-2mm durotomy in the midline just above the cranial edge of L5. I placed a cottonoid here and that stopped the CSF from leaking temporarily. I continued with the decompression. The hypertrophic ligamentum was resected with the Kerrison on either side. I undercut the facet joint. I made sure to leave enough pars and facet joint for stability purposes. I could now pass a Melissa probe along the lateral recess on either side without any notable compression. The L5 nerves and central thecal sac appeared well decompressed. I now addressed the durotomy. Given there wasn't any fluid noted to be leaking with the cottonoid placed over this and because of its location being just under the leading edge of cranial L5 I felt that a duragen patch would be best. I had the bed tilted head down and removed the cottonoid the thecal sac deflated somewhat with this after some additional CSF had leaked. I cut a small duragen into four peices. I tucked a quarter of duragen patch under the leading edge of the bone and then backed this up an additional quarter patch on either side. With this I did not notice any CSF leak. I sprayed duraseal in the laminotomy site centrally.     At this point I moved caudal to the right at L5-S1. I used the high speed griffin to create right L5 laminotomy and partial medial facetectomy of L5-S1 on the right side. I undercut the facet with the Kerrison. I then resected the ligamentum. At  this point the traversing S1 nerve root was visibly and palpably decompressed. I could pass the Melissa probe along the right lateral recess and it had no compression. I irrigated this site and placed bone wax on the bone ends. Some Floseal was placed and then a cottonoid over the laminotomy site. I proceeded over to the l left side at L5-S1.    In similar fashion to above I used the high speed griffin to create left L5 laminotomy and partial medial facetectomy of L5-S1 on the left side. I undercut the facet with the Kerrison. I then resected the ligamentum with the Kerrison. I ensured that the majority of the facet and pars was left intact for stability purposes. At this point the traversing left S1 nerve root was visibly and palpably decompressed. I could pass the Melissa probe along the right lateral recess and it had no compression. I irrigated this site and placed bone wax on the bone ends. Some Floseal was placed and then a cottonoid.     Each site was then copiously irrigated again. Hemostasis was achieved with bipolar cautery, bone wax and placement of some Floseal in the epidural space. I returned to the L4-5 level where the durotomy was. I had anesthesia perform a valsalva maneuver and there was no sign of CSF leak. Plain 0.25% marcaine without epinephrine was injected into the paraspinal muscles on either side and in the subcutaneous tissue along the incision. No drain was placed. The wound was then closed carefully using #1 Vicryl suture at the paraspinal fascia, then 2-0 Vicryl in the deep dermal layer and a 3-0 Monocryl in the subcuticular layer.  Steri-Strips and sterile dressings are applied.  The patient appeared to tolerate the procedure well and was escorted the recovery room in satisfactory condition.            Condition: Stable     Weight bearing status: Strict flat bedrest until tomorrow AM     Activity:      Anticoagulation plan:    Plan:            Gary Silva MD  [facility]  Orthopedics  Date:  10/6/2020  12:35 PM   Strict flat bedrest until tomorrow AM      Ambulation and mechanical prophylaxis.    Admit to obs. Flat bedrest until the AM. Keep ferris in for now. TOmorrow AM will do progressive HOB elevation and if doesn't develop symptoms of CSF leak then will allow to mobilize and change activity level. Periop Ancef. Multimodal pain control

## 2020-10-06 NOTE — ANESTHESIA PREPROCEDURE EVALUATION
Anesthesia Pre-Procedure Evaluation    Patient: Shante Carrington   MRN: [id number] : 1955          Preoperative Diagnosis: Lumbar disc herniation [M51.26]    Procedure(s):  Lumbar 4-5 bilateral decompression, Lumbar 5- Sacral 1 bilateral lateral decompression.    Past Medical History:   Diagnosis Date     Hypothyroidism      PONV (postoperative nausea and vomiting)      Prediabetes      Rosacea      Spinal stenosis      Past Surgical History:   Procedure Laterality Date     DILATION AND CURETTAGE, OPERATIVE HYSTEROSCOPY, COMBINED N/A 2016    Procedure: COMBINED DILATION AND CURETTAGE, OPERATIVE HYSTEROSCOPY;  Surgeon: Rio Fernando MD;  Location: [facility] OR     ENT SURGERY         Anesthesia Evaluation     . Pt has had prior anesthetic. Type: General and MAC    History of anesthetic complications   - PONV        ROS/MED HX    ENT/Pulmonary:     (+)allergic rhinitis, , . .    Neurologic:  - neg neurologic ROS     Cardiovascular:     (+) Dyslipidemia, ----. : . . . :. .       METS/Exercise Tolerance:     Hematologic:  - neg hematologic  ROS       Musculoskeletal:   (+) arthritis,  -       GI/Hepatic:  - neg GI/hepatic ROS       Renal/Genitourinary:  - ROS Renal section negative       Endo:     (+) thyroid problem hypothyroidism, Other Endocrine Disorder prediabetic.      Psychiatric:  - neg psychiatric ROS       Infectious Disease:  - neg infectious disease ROS       Malignancy:      - no malignancy   Other:    - neg other ROS                      Physical Exam  Normal systems: cardiovascular, pulmonary and dental    Airway   Mallampati: II  TM distance: >3 FB  Neck ROM: full    Dental     Cardiovascular       Pulmonary             No results found for: WBC, HGB, HCT, PLT, CRP, SED, NA, POTASSIUM, CHLORIDE, CO2, BUN, CR, GLC, DOUG, PHOS, MAG, ALBUMIN, PROTTOTAL, ALT, AST, GGT, ALKPHOS, BILITOTAL, BILIDIRECT, LIPASE, AMYLASE, ASMITA, PTT, INR, FIBR, TSH, T4, T3, HCG, HCGS, CKTOTAL, CKMB, TROPN    Preop  "Vitals  BP Readings from Last 3 Encounters:   10/06/20 106/74   08/01/16 100/65   06/22/16 100/66    Pulse Readings from Last 3 Encounters:   10/06/20 74   06/22/16 75   08/15/14 95      Resp Readings from Last 3 Encounters:   10/06/20 16   08/01/16 16   08/14/14 15    SpO2 Readings from Last 3 Encounters:   10/06/20 96%   08/01/16 96%   08/15/14 95%      Temp Readings from Last 1 Encounters:   10/06/20 36.9  C (98.5  F) (Oral)    Ht Readings from Last 1 Encounters:   10/06/20 1.499 m (4' 11\")      Wt Readings from Last 1 Encounters:   10/06/20 64.4 kg (142 lb)    Estimated body mass index is 28.68 kg/m  as calculated from the following:    Height as of this encounter: 1.499 m (4' 11\").    Weight as of this encounter: 64.4 kg (142 lb).       Anesthesia Plan      History & Physical Review      ASA Status:  2 .    NPO Status:  > 6 hours    Plan for General with Intravenous and Propofol induction. Maintenance will be Balanced.    PONV prophylaxis:  Ondansetron (or other 5HT-3) and Dexamethasone or Solumedrol         Postoperative Care  Postoperative pain management:  IV analgesics and Oral pain medications.      Consents  Anesthetic plan, risks, benefits and alternatives discussed with:  Patient..                 BALA Warren CRNA  "

## 2020-10-06 NOTE — ANESTHESIA POSTPROCEDURE EVALUATION
Patient: Shante Carrington    Procedure(s):  Lumbar 4-5 bilateral decompression, Lumbar 5- Sacral 1 bilateral lateral decompression.    Diagnosis:Lumbar disc herniation [M51.26]  Diagnosis Additional Information: No value filed.    Anesthesia Type:  General    Note:  Anesthesia Post Evaluation    Patient location during evaluation: Phase 2  Patient participation: Able to fully participate in evaluation  Level of consciousness: awake and alert  Pain management: adequate  Airway patency: patent  Cardiovascular status: acceptable and hemodynamically stable  Respiratory status: acceptable, room air and spontaneous ventilation  Hydration status: acceptable  PONV: none     Anesthetic complications: None          Last vitals:  Vitals:    10/06/20 1145 10/06/20 1200 10/06/20 1206   BP: 120/70 114/72 112/78   Pulse: 89 82 79   Resp: 16 16 16   Temp:      SpO2: 97% 97% 95%         Electronically Signed By: BALA Rodas CRNA  October 6, 2020  12:46 PM

## 2020-10-06 NOTE — OR NURSING
preop meds given. [place] well. Right foot n/t preop. Equal strength. Back pain is a 3 preop.  with pt .

## 2020-10-07 ENCOUNTER — APPOINTMENT (OUTPATIENT)
Dept: PHYSICAL THERAPY | Facility: CLINIC | Age: 65
End: 2020-10-07
Attending: ORTHOPAEDIC SURGERY
Payer: COMMERCIAL

## 2020-10-07 VITALS
OXYGEN SATURATION: 96 % | DIASTOLIC BLOOD PRESSURE: 66 MMHG | BODY MASS INDEX: 31.29 KG/M2 | SYSTOLIC BLOOD PRESSURE: 143 MMHG | RESPIRATION RATE: 18 BRPM | TEMPERATURE: 98.5 F | HEIGHT: 59 IN | HEART RATE: 75 BPM | WEIGHT: 155.2 LBS

## 2020-10-07 PROCEDURE — 250N000013 HC RX MED GY IP 250 OP 250 PS 637: Performed by: ORTHOPAEDIC SURGERY

## 2020-10-07 PROCEDURE — 250N000011 HC RX IP 250 OP 636: Performed by: ORTHOPAEDIC SURGERY

## 2020-10-07 PROCEDURE — 258N000003 HC RX IP 258 OP 636: Performed by: NURSE ANESTHETIST, CERTIFIED REGISTERED

## 2020-10-07 PROCEDURE — 97161 PT EVAL LOW COMPLEX 20 MIN: CPT | Mod: GP | Performed by: PHYSICAL THERAPIST

## 2020-10-07 PROCEDURE — 999N000111 HC STATISTIC OT IP EVAL DEFER

## 2020-10-07 RX ORDER — HYDROXYZINE HYDROCHLORIDE 25 MG/1
25 TABLET, FILM COATED ORAL EVERY 6 HOURS PRN
Qty: 30 TABLET | Refills: 1 | Status: SHIPPED | OUTPATIENT
Start: 2020-10-07 | End: 2021-11-03

## 2020-10-07 RX ORDER — AMOXICILLIN 250 MG
2 CAPSULE ORAL 2 TIMES DAILY
COMMUNITY
Start: 2020-10-07 | End: 2023-03-21

## 2020-10-07 RX ORDER — AMOXICILLIN 250 MG
2 CAPSULE ORAL 2 TIMES DAILY
Status: DISCONTINUED | OUTPATIENT
Start: 2020-10-07 | End: 2020-10-07 | Stop reason: HOSPADM

## 2020-10-07 RX ORDER — HYDROCODONE BITARTRATE AND ACETAMINOPHEN 5; 325 MG/1; MG/1
1-2 TABLET ORAL EVERY 4 HOURS PRN
Qty: 40 TABLET | Refills: 0 | Status: SHIPPED | OUTPATIENT
Start: 2020-10-07 | End: 2021-11-03

## 2020-10-07 RX ORDER — GABAPENTIN 300 MG/1
600 CAPSULE ORAL AT BEDTIME
Status: DISCONTINUED | OUTPATIENT
Start: 2020-10-07 | End: 2020-10-07 | Stop reason: HOSPADM

## 2020-10-07 RX ADMIN — SODIUM CHLORIDE, POTASSIUM CHLORIDE, SODIUM LACTATE AND CALCIUM CHLORIDE: 600; 310; 30; 20 INJECTION, SOLUTION INTRAVENOUS at 02:44

## 2020-10-07 RX ADMIN — OMEPRAZOLE 20 MG: 20 CAPSULE, DELAYED RELEASE ORAL at 05:24

## 2020-10-07 RX ADMIN — CETIRIZINE HYDROCHLORIDE 10 MG: 10 TABLET, FILM COATED ORAL at 07:58

## 2020-10-07 RX ADMIN — CEFAZOLIN SODIUM 1 G: 1 INJECTION, SOLUTION INTRAVENOUS at 02:41

## 2020-10-07 RX ADMIN — HYDROXYZINE HYDROCHLORIDE 25 MG: 25 TABLET, FILM COATED ORAL at 08:28

## 2020-10-07 RX ADMIN — HYDROCODONE BITARTRATE AND ACETAMINOPHEN 2 TABLET: 5; 325 TABLET ORAL at 10:44

## 2020-10-07 RX ADMIN — SENNOSIDES AND DOCUSATE SODIUM 2 TABLET: 8.6; 5 TABLET ORAL at 10:44

## 2020-10-07 RX ADMIN — HYDROCODONE BITARTRATE AND ACETAMINOPHEN 2 TABLET: 5; 325 TABLET ORAL at 05:24

## 2020-10-07 RX ADMIN — HYDROXYZINE HYDROCHLORIDE 25 MG: 25 TABLET, FILM COATED ORAL at 02:41

## 2020-10-07 NOTE — PROGRESS NOTES
Melanie has been doing well sitting up in the bedside chair. She says this is much better than laying down so far. Denies headache, photophobia, nausea. She has been snacking and drinking fluids. She is eager to get the ferris catheter out. Activity restrictions have been changed and PT/OT consults placed. Anticipate discharge late morning or early afternoon.

## 2020-10-07 NOTE — PROGRESS NOTES
Melanie has been sitting up with HOB 45 degrees. Denies any headache, photophobia, N/V. I have transitioned her to sitting in bedside chair upright. Will re-check on her ~1030AM. If no symptoms of CSF leak then will have ferris discontinued and order PT

## 2020-10-07 NOTE — DISCHARGE INSTRUCTIONS
Lumbar Spine Surgery Discharge Instructions    Your surgery was: L4-5 and L5-S1 decompression surgery    Your surgeon is: Gary Silva MD    Restrictions after lumbar surgery:  - You should not do any excessive bending or twisting of your back beyond that needed to get in and out of a bed/chair, a car, or other similar daily activities.    - No lifting greater than 10 lbs (approximately what 1 gallon of milk weighs) until you are instructed that it is okay at your clinic follow-up visit.    - You should not drive a car or operate heavy machinery if you are taking prescribed narcotic pain medication    - You had a dural tear / CSF leak at the time of surgery. It is recommended that you keep your activity limited during the few days post-operative after discharge. It is still a good idea to do a few short walks within your house to prevent blood clots, etc. If you start to develop pulsatile headaches that get worse when you stand up and better when you lay down, clear drainage from the incision, insensitivity to light, then please reach out to Dr. Silva's team    - Wound Care Instructions: Under your operative site dressing there are steri strips (small band-aids that go over the incision site). You can remove your dressing two days after discharge prior to your first time showering. Leave steri strips on until they fall off on their own or until you return to clinic for your post-operative appointment. It is okay to shower and get your wound wet, just do not let the water spray directly on your incision. Do not soak in a bathtub or swimming pool until you are told it is okay to do so when you return to clinic. The sutures are all buried under the skin and will dissolve on their own with time. If there is any drainage from the incision then you should place a new dry gauze dressing over it after a shower. If the incision and steri strips are dry then you can leave it without a dressing.    - Walking is your main  physical therapy for now - we generally will not order PT unless it is determined at a later date in clinic that this is necessary. You should generally try to do at least short walks several times daily.    - If you are prescribed narcotic pain medications (Oxycodone, Norco, Percocet, Tylenol #3, Dilaudid, etc) then you should try to wean off of them as tolerated. These are AS NEEDED medications, so if you are not having significant pain you should try to take fewer pills at a time or spread out the doses out over a longer period of time than is written on the prescription. As an example if you are prescribed 1-2 pills of pain medication every 4 hours AS NEEDED for pain, then you should begin taking only 1 pill every 4 hours as your pain tolerates. Then when 1 pill is giving good pain relief you should try to spread the doses out longer than 4 hours apart as you can tolerate it.    - You should avoid taking any more pain medications than is written on the prescription. In the event that you run out of the pills prior to when you should based on the written instructions, it is likely that your insurance provider will deny paying for a refill early.    - If your pain pill contains Tylenol (i.e. Percocet, Norco, Tylenol #3) and you are also taking additional Tylenol/acetaminophen as a pain medication, ensure that you are not taking too much tylenol. Prescription narcotic medications that contain Tylenol usually have 325mg of Tylenol per pill and over-the-counter medications have variable dose of Tylenol. You should not exceed 4,000mg of Tylenol in a 24-hour period.    Call the office if you have any questions/concerns or are experiencing the following:  - increasing drainage from the incision  - foul-smelling or malodorous drainage from the incision  - increasing pain not controlled by your prescribed medications  - inability to urinate  - new onset of weakness, numbness or severe pain in the extremities  -  bowel/bladder incontinence  - Fever greater than 101.5 degrees  - Nausea/vomitting causing inability to eat food or medications    During normal business hours 7:30AM to 5:00PM on Monday-Friday you can reach my clinical assistant Verónica at [phone] and after hours you can reach the call-center for on-call physician at [phone]

## 2020-10-07 NOTE — PLAN OF CARE
"Pt alert, oriented, bedrest. LR infusing at 100 ml/hr. Muñoz in place and patent. Prn norco given for pain. Denies headache, nausea, SOB. Regular diet. /60   Pulse 72   Temp 98.7  F (37.1  C) (Oral)   Resp 16   Ht 1.499 m (4' 11\")   Wt 70.4 kg (155 lb 3.3 oz)   SpO2 97%   BMI 31.35 kg/m      "

## 2020-10-07 NOTE — PLAN OF CARE
Pain controlled with oral norco and IV dilaudid for break through pain.  She has been compliant with bedrest and verbalizes understanding.  Denies any headache or post operative complications.  Muñoz patent and she is eating a regular diet without n/v.  Bed alarm in place for safety.

## 2020-10-07 NOTE — DISCHARGE SUMMARY
[facility] Orthopedics Discharge Summary                                  [facility]     AILIN NORWOOD [id number]   Age: 64 year old  PCP: Edgar Hilariobooker Jimenes, [phone] 1955     Date of Admission:  10/6/2020  Date of Discharge::  10/7/2020  Discharge Physician:  Gary Silva MD    Code status:  Full Code    Admission Information:  Admission Diagnosis:  Lumbar disc herniation [M51.26]    Post-Operative Day: 1 Day Post-Op     Reason for admission:  The patient was admitted for the following:Procedure(s) (LRB):  Lumbar 4-5 bilateral decompression, Lumbar 5- Sacral 1 bilateral lateral decompression. (N/A) Surgery was complicated by dural tear. She was kept on flat bedrest until the day after surgery. Progressive elevation of her head of bed was done in the AM after surgery and she did not develop any symptoms of CSF leak. We eventually mobilized her to walk and she was able to do this with therapy. She had her ferris removed and was able to urinate. She did not have a BM    Active Problems:    S/P lumbar laminectomy      Allergies:  Percocet [oxycodone-acetaminophen], Zofran [ondansetron], Venlafaxine, and Zolpidem    Following the procedure noted above the patient was transferred to the post-op floor and started on:    Therapy:  physical therapy and occupational therapy  Anticoagulation Plan: Mechanical and/or ambulation   Pain Management: norco and vistaril  Weight bearing status: Weight bearing as tolerated     The patient was followed and co-managed by the hospitalist service during the inpatient treatment course  Complications:  Intra-operative dural tear  Consultations:  None     Pertinent Labs   Lab Results: personally reviewed.     No results for input(s): INR, HGB, HCT, MCV, PLT, NA, CRP in the last [id number] hours.    Invalid input(s):  WBC, K, GLU, SEDRATE       Discharge Information:  Condition at discharge: Stable  Discharge destination:  Discharged to home     Medications at  discharge:  Current Discharge Medication List      START taking these medications    Details   HYDROcodone-acetaminophen (NORCO) 5-325 MG tablet Take 1-2 tablets by mouth every 4 hours as needed for moderate to severe pain  Qty: 40 tablet, Refills: 0    Associated Diagnoses: S/P lumbar laminectomy      hydrOXYzine (ATARAX) 25 MG tablet Take 1 tablet (25 mg) by mouth every 6 hours as needed for other (adjuvant pain)  Qty: 30 tablet, Refills: 1    Associated Diagnoses: S/P lumbar laminectomy      senna-docusate (SENOKOT-S/PERICOLACE) 8.6-50 MG tablet Take 2 tablets by mouth 2 times daily  Qty:      Associated Diagnoses: S/P lumbar laminectomy         CONTINUE these medications which have NOT CHANGED    Details   cetirizine (ZYRTEC) 10 MG tablet Take 10 mg by mouth daily      citalopram (CELEXA) 40 MG tablet Take 40 mg by mouth every evening       estradiol (VAGIFEM) 10 MCG TABS vaginal tablet Place 10 mcg vaginally twice a week On Monday and Thursday      ESTRADIOL PO Take 1 mg by mouth every evening After supper      gabapentin (NEURONTIN) 300 MG capsule Take 600 mg by mouth At Bedtime       ibuprofen (ADVIL,MOTRIN) 200 MG tablet Take 3 tablets (600 mg) by mouth every 6 hours as needed for pain (mild)    Associated Diagnoses: Post-menopausal bleeding      levothyroxine (SYNTHROID, LEVOTHROID) 50 MCG tablet Take 50 mcg by mouth daily       omeprazole (PRILOSEC) 20 MG capsule Take 20 mg by mouth every evening       OTHER MEDICAL SUPPLIES At Bedtime CPAP      progesterone (PROMETRIUM) 100 MG capsule Take 100 mg by mouth At Bedtime       traZODone (DESYREL) 100 MG tablet Take 1.5-2 tablets by mouth At Bedtime      Ascorbic Acid (VITAMIN C PO) Take 500 mg by mouth       calcium carbonate (OS-DOUG 500 MG [place]. [place]) 500 MG tablet Take 500 mg by mouth 2 times daily      Cholecalciferol (VITAMIN D PO) Take 2,000 Units by mouth daily       Multiple Vitamins-Minerals (DAILY MULTI VITAMIN/MINERALS PO) Take 1 tablet by mouth daily        Omega-3 Fatty Acids (OMEGA-3 FISH OIL PO)          STOP taking these medications       Acetaminophen (TYLENOL PO) Comments:   Reason for Stopping:         traMADol (ULTRAM) 50 MG tablet Comments:   Reason for Stopping:                          Follow-Up Care:  Patient should be seen in the office in 3 weeks by the Orthopedic Surgeon/Physician Assistant.  Call [phone] for appointment or questions.    Gary Silva MD

## 2020-10-07 NOTE — PLAN OF CARE
[facility] DISCHARGE NOTE    Patient discharged to home at 1:40 PM via ambulation. Accompanied by spouse and staff. Discharge instructions reviewed with patient, opportunity offered to ask questions. Prescriptions filled and sent with patient upon discharge. All belongings sent with patient.    Marisol Farr RN

## 2020-10-07 NOTE — PROGRESS NOTES
10/07/20 1100   Quick Adds   Type of Visit Initial PT Evaluation   Living Environment   People in home spouse   Current Living Arrangements house   Home Accessibility stairs within home   Number of Stairs, Within Home, Primary   (one flight , railing on R ascedning steps)   Self-Care   Equipment Currently Used at Home none   Disability/Function   Hearing Difficulty or Deaf no   Wear Glasses or Blind no   Concentrating, Remembering or Making Decisions Difficulty no   Difficulty Communicating no   Difficulty Eating/Swallowing no   Walking or Climbing Stairs Difficulty yes  (R LE SX/)   Dressing/Bathing Difficulty no   Toileting no   Doing Errands Independently Difficulty (such as shopping) no   Fall history within last six months no   General Information   Onset of Illness/Injury or Date of Surgery 10/06/20   Referring Physician Dr Silva   Patient/Family Therapy Goals Statement (PT) return home   Pertinent History of Current Problem (include personal factors and/or comorbidities that impact the POC) s/p Lumbar 4-5 bilateral decompression, Lumbar 5- Sacral 1 bilateral lateral decompression. (N/A) Surgery was complicated by dural tear. She was kept on flat bedrest until the day after surgery. Progressive elevation of her head of bed was done in the AM after surgery and she did not develop any symptoms of CSF leak. Activity orders up graded to activity- up ad radu   Existing Precautions/Restrictions spinal   General Observations Addtional precautions.   Avoid excessive bending/twisting. No lifting >10lbs.. Pt alert, up in the chiair reports Pain at 7/10. No RLE radicular SX noted    Cognition   Orientation Status (Cognition) oriented x 3   Affect/Mental Status (Cognition) WNL   Pain Assessment   Patient Currently in Pain Yes, see Vital Sign flowsheet   Posture    Posture Not impaired   Range of Motion (ROM)   ROM Comment WFL    Strength   Strength Comments NT formally due to pain( no resisitance applied). Pt does  report mild pain, pulling w/ R LE AROM    Bed Mobility   Comment (Bed Mobility) NT- verbally reviewed log roll tecnique, hand out issued re- body mechanics    Transfers   Transfer Safety Comments SBA sit<.> stand w/ RW   Gait/Stairs (Locomotion)   [place] Level (Gait) supervision   Assistive Device (Gait) walker, front-wheeled   Distance in Feet (Required for LE Total Joints)   (150 feet x2)   Deviations/Abnormal Patterns (Gait)   (steady, guarded)   Negotiation (Stairs) stairs assistive device   Comment (Gait/Stairs) Pt amb 150 feet x2 with RW/ SBA. Steady appears mildly guarded; practiced stair amb w/ use of unilateral railing, cues; practiced amb a short distance w/o walker support- pt needing hand held assistance for balance .  Pt to obtain a walker form DME store indep. W/ precription   Balance   Balance Comments good dynamic standing balance   Sensory Examination   Sensory Perception patient reports no sensory changes   Clinical Impression   Criteria for Skilled Therapeutic Intervention yes, treatment indicated   Influenced by the following impairments Pain, RLE , LBP   Functional limitations due to impairments altered mobility - decreased amb status on level surfaces, steps   Clinical Presentation Stable/Uncomplicated   Clinical Presentation Rationale clinical judgement   Clinical Decision Making (Complexity) low complexity   Therapy Frequency (PT) One time eval and treatment only   Planned Therapy Interventions (PT) gait training;home exercise program   Anticipated Equipment Needs at Discharge (PT) walker, rolling   Risk & Benefits of therapy have been explained care plan/treatment goals reviewed;patient   Clinical Impression Comments ONe time session. Pt ambulating w/ useof RW sufficiently to return home;able to navigate steps w/ SBA.  Discussed posture/ body mechanics ; walking for exercise program- Recommend for pt to hold on LB stretches until follow up w/ Spine[facility]    PT Discharge Planning    PT  "Discharge Recommendation (DC Rec) home;home with assist   [facility] AM-PAC  \"6 Clicks\" V.2 Basic Mobility Inpatient Short Form   1. Turning from your back to your side while in a flat bed without using bedrails? 3 - A Little   2. Moving from lying on your back to sitting on the side of a flat bed without using bedrails? 3 - A Little   3. Moving to and from a bed to a chair (including a wheelchair)? 3 - A Little   4. Standing up from a chair using your arms (e.g., wheelchair, or bedside chair)? 3 - A Little   5. To walk in hospital room? 3 - A Little   6. Climbing 3-5 steps with a railing? 3 - A Little   Basic Mobility Raw Score (Score out of 24.Lower scores equate to lower levels of function) 18     "

## 2020-10-07 NOTE — PLAN OF CARE
OT: Orders received. Completed IP OT screen and no skilled needs identified. Able to demo lower body dressing within spinal precautions and pt has no concerns regarding ADLs. Please see PT note for mobility details.

## 2020-10-07 NOTE — PROGRESS NOTES
"[facility] Progress Note      Post-operative Day: 1 Day Post-Op    Procedure(s):  Lumbar 4-5 bilateral decompression, Lumbar 5- Sacral 1 bilateral lateral decompression.      Subjective:  Desire had an uneventful night. She says she didn't sleep much secondary to pain in her back at the surgical site. She denies any chest pain or shortness of breath. She denies any nausea/vomiting. She has been tolerating regular diet. She denies any headache. She says the right leg pain has improved. Pain localized to surgical incision area. Muñoz catheter is in still due to her bedrest restriction.    Objective:  Blood pressure 130/57, pulse 82, temperature 98.5  F (36.9  C), temperature source Oral, resp. rate 18, height 1.499 m (4' 11\"), weight 70.4 kg (155 lb 3.3 oz), SpO2 94 %.    Patient Vitals for the past 24 hrs:   BP Temp Temp src Pulse Resp SpO2 Height Weight   10/07/20 0732 130/57 98.5  F (36.9  C) Oral 82 18 94 % -- --   10/07/20 0248 137/60 98.7  F (37.1  C) Oral 72 16 97 % -- --   10/07/20 0052 120/54 98  F (36.7  C) Oral 77 16 97 % -- --   10/06/20 1930 122/55 98.1  F (36.7  C) Oral 81 16 97 % -- --   10/06/20 1707 -- -- -- -- 16 -- -- --   10/06/20 1700 114/54 97.4  F (36.3  C) Oral 77 16 96 % -- --   10/06/20 1530 -- -- -- -- 16 -- -- --   10/06/20 1412 -- -- -- -- 12 -- -- --   10/06/20 1401 123/64 -- -- 73 -- -- -- --   10/06/20 1330 120/62 -- -- 75 -- -- -- --   10/06/20 1305 123/62 98  F (36.7  C) Oral 75 10 98 % 1.499 m (4' 11\") 70.4 kg (155 lb 3.3 oz)   10/06/20 1206 112/78 -- -- 79 16 95 % -- --   10/06/20 1200 114/72 -- -- 82 16 97 % -- --   10/06/20 1145 120/70 -- -- 89 16 97 % -- --   10/06/20 1130 114/69 -- -- 92 16 97 % -- --   10/06/20 1115 130/73 97.6  F (36.4  C) Axillary 95 12 95 % -- --       Wt Readings from Last 4 Encounters:   10/06/20 70.4 kg (155 lb 3.3 oz)   08/01/16 66.7 kg (147 lb 0.8 oz)   06/22/16 66 kg (145 lb 9.6 oz)         Motor function, sensation, and circulation intact "   Yes  Wound status: her surgical dressing is dry with small amount of blood in the midline that was similar to what I saw yesterday post-op  Calf tenderness: Bilateral  No    Pertinent Labs   Lab Results: personally reviewed.     No results for input(s): INR, HGB, HCT, MCV, PLT, NA, CRP in the last [id number] hours.    Invalid input(s):  WBC, K, GLU, SEDRATE    Plan: Anticoagulation protocol: Mechanical and/or ambulation               Pain medications:  norco and vistaril            Continue bedrest - will elevate the HOB to 30 degrees and then progressively elevate and eventually get her up on her feet mobilizing pending no symptoms of CSF leak            Will re-check at 830             Continue cares and rehabilitation     Report completed by:  Gary Silva MD  Date: 10/7/2020  Time: 7:57 AM

## 2021-04-19 ENCOUNTER — MEDICAL CORRESPONDENCE (OUTPATIENT)
Dept: HEALTH INFORMATION MANAGEMENT | Facility: CLINIC | Age: 66
End: 2021-04-19

## 2021-04-22 ENCOUNTER — HOSPITAL ENCOUNTER (OUTPATIENT)
Dept: PHYSICAL THERAPY | Facility: CLINIC | Age: 66
Setting detail: THERAPIES SERIES
End: 2021-04-22
Attending: PHYSICIAN ASSISTANT
Payer: COMMERCIAL

## 2021-04-22 PROCEDURE — 97162 PT EVAL MOD COMPLEX 30 MIN: CPT | Mod: GP | Performed by: PHYSICAL THERAPIST

## 2021-04-22 PROCEDURE — 97140 MANUAL THERAPY 1/> REGIONS: CPT | Mod: GP | Performed by: PHYSICAL THERAPIST

## 2021-04-22 PROCEDURE — 97110 THERAPEUTIC EXERCISES: CPT | Mod: GP | Performed by: PHYSICAL THERAPIST

## 2021-04-22 NOTE — PROGRESS NOTES
04/22/21 0800   General Information   Type of Visit Initial OP Ortho PT Evaluation   Start of Care Date 04/22/21   Referring Physician BERT Cordero   Patient/Family Goals Statement improve sleep, decrease pain, initiate daily exercises   Orders Evaluate and Treat   Date of Order 04/19/21   Certification Required? No   Medical Diagnosis thoracic spondylosis and post operative lumbar recovery   Surgical/Medical history reviewed Yes   Precautions/Limitations no known precautions/limitations   Body Part(s)   Body Part(s) Lumbar Spine/SI   Presentation and Etiology   Pertinent history of current problem (include personal factors and/or comorbidities that impact the POC) Pt had L4-S1 decompression in 10/7/21 complicated by dural tear.  Low back  is still stiff.  More recently did some yard work, twisting, increase in walking and now has mid to upper back pain on the right, starting about one month ago.  Pain constant at worst 8/10.  States that sometimes it catches and makes it harder to breathe.  Sleep affected, waking up to 6x/night having a hard time finding a comfortable position (no  position great, maybe laying on back feels best but hard to fall asleep that way).  Tried chiropractor and massage, massage helped short term.  Meds: steroids (helps some), gabapentin, ibuprofen at night, muscle relaxer.  PMH: arthritis, depression   Impairments A. Pain;B. Decreased WB tolerance;E. Decreased flexibility;F. Decreased strength and endurance;M. Locking or catching   Pain quality A. Sharp;C. Aching;E. Shooting;G. Cramping   Pain/symptoms exacerbated by M. Other;G. Certain positions;I. Bending;K. Home tasks   Pain exacerbation comment laying in bed, twisting, walking>1mi, sitting/standing >1hr, heavy lifting, reaching   Pain/symptoms eased by H. Cold;I. OTC medication(s);B. Walking;E. Changing positions;F. Certain positions  (walking short distances)   Progression of symptoms since onset: Improved  (with steroids)    Prior Level of Function   Prior Level of Function-Mobility independent   Prior Level of Function-ADLs independent   Functional Level Prior Comment gardening, walking   Current Level of Function   Patient role/employment history F. Retired   Fall Risk Screen   Fall screen completed by PT   Have you fallen 2 or more times in the past year? No   Have you fallen and had an injury in the past year? No   Is patient a fall risk? No   Abuse Screen (yes response referral indicated)   Feels Unsafe at Home or Work/School no   Feels Threatened by Someone no   Does Anyone Try to Keep You From Having Contact with Others or Doing Things Outside Your Home? no   Physical Signs of Abuse Present no   Lumbar Spine/SI Objective Findings   Flexion ROM 75%   Extension ROM 50%   Right Side Bending ROM 75%   Left Side Bending ROM WNL   Lumbar ROM Comment thoracic rotation 50% to R w/ R mid to upper back pain, 75% to L   Hip Screen increased LBP with R hip flexion, scour/JOSE JUAN/FADIR neg B   Hip Flexion (L2) Strength R: 4+/5,  L: 4/5 w/ mild pain in mid back   Hip Abduction Strength 4+/5 B   Knee Flexion Strength 5/5 B   Knee Extension (L3) Strength 5/5 B   Ankle Dorsiflexion (L4) Strength 4+/5 B   Hamstring Flexibility tight R>L.  SLR: 45* R, 60* L   SLR R: pos for LBP pain, increased pain with dorsiflexion, L: hamstring tightness   Segmental Mobility hypomobile thoracic spine   Palpation moderate hypertonic/tender middle/lower trap and paraspinals on R   Planned Therapy Interventions   Planned Therapy Interventions joint mobilization;manual therapy;neuromuscular re-education;ROM;strengthening;stretching   Planned Modality Interventions   Planned Modality Interventions Cryotherapy   Clinical Impression   Criteria for Skilled Therapeutic Interventions Met yes, treatment indicated   PT Diagnosis s/p L4-S1 decompression 10/7/20, R sided mid-back pain with decreased ROM   Influenced by the following impairments pain, increased tone, decreased  thoracic/lumbar mobility, neural tension RLE   Functional limitations due to impairments sleep, bending, heavy lifting, twisting, long distance walking, prolonged sitting/standing, reaching, recreational activities   Clinical Presentation Stable/Uncomplicated   Clinical Presentation Rationale improving, minimal PMH to affect progress   Clinical Decision Making (Complexity) Moderate complexity   Therapy Frequency 1 time/week   Predicted Duration of Therapy Intervention (days/wks) 8 weeks   Risk & Benefits of therapy have been explained Yes   Patient, Family & other staff in agreement with plan of care Yes   Clinical Impression Comments Pt is 66 y/o female s/p L4-S1 decompression 10/7/20 and more recently R sided mid-back pain who presents to PT with pain, increased tone, decreaesed ROM and thoracic/lumbar mobility, and neural tension in RLE.  Pt has good potential to benefit from skilled PT to address identified deficits.  Pt is motivated to participate in therapy and has minimal PMH to affect progression.   Education Assessment   Barriers to Learning No barriers   ORTHO GOALS   PT Ortho Eval Goals 1;2;3   Ortho Goal 1   Goal Identifier 1. Sleep   Goal Description Pt will report waking no more than 2x/night d/t back pain to improve tolerance for laying in bed and sleep.   Target Date 05/20/21   Ortho Goal 2   Goal Identifier 2. ROM   Goal Description Pt will improve thoracic rotation to at least 75% w/o pain to improve tolerance for daily activities.   Target Date 06/17/21   Ortho Goal 3   Goal Identifier 3. Neural tension   Goal Description Pt will have slight to no discomfort with SLR to demonstrate improve mobility and to increase tolerance for daily activity.   Target Date 06/17/21   Total Evaluation Time   PT Eval, Moderate Complexity Minutes (46705) 35       Deanna Antonio, PT, DTP, [facility]  Doctor of Physical Therapy #[id number]  [facility]  [facility]  [phone]  [email or web address]

## 2021-04-29 ENCOUNTER — HOSPITAL ENCOUNTER (OUTPATIENT)
Dept: PHYSICAL THERAPY | Facility: CLINIC | Age: 66
Setting detail: THERAPIES SERIES
End: 2021-04-29
Attending: PHYSICIAN ASSISTANT
Payer: COMMERCIAL

## 2021-04-29 PROCEDURE — 97140 MANUAL THERAPY 1/> REGIONS: CPT | Mod: GP | Performed by: PHYSICAL THERAPIST

## 2021-04-29 PROCEDURE — 97110 THERAPEUTIC EXERCISES: CPT | Mod: GP | Performed by: PHYSICAL THERAPIST

## 2021-05-06 ENCOUNTER — HOSPITAL ENCOUNTER (OUTPATIENT)
Dept: PHYSICAL THERAPY | Facility: CLINIC | Age: 66
Setting detail: THERAPIES SERIES
End: 2021-05-06
Attending: PHYSICIAN ASSISTANT
Payer: COMMERCIAL

## 2021-05-06 PROCEDURE — 97140 MANUAL THERAPY 1/> REGIONS: CPT | Mod: GP | Performed by: PHYSICAL THERAPIST

## 2021-05-06 PROCEDURE — 97110 THERAPEUTIC EXERCISES: CPT | Mod: GP | Performed by: PHYSICAL THERAPIST

## 2021-05-13 ENCOUNTER — HOSPITAL ENCOUNTER (OUTPATIENT)
Dept: PHYSICAL THERAPY | Facility: CLINIC | Age: 66
Setting detail: THERAPIES SERIES
End: 2021-05-13
Attending: PHYSICIAN ASSISTANT
Payer: COMMERCIAL

## 2021-05-13 PROCEDURE — 97110 THERAPEUTIC EXERCISES: CPT | Mod: GP | Performed by: PHYSICAL THERAPIST

## 2021-05-13 PROCEDURE — 97140 MANUAL THERAPY 1/> REGIONS: CPT | Mod: GP | Performed by: PHYSICAL THERAPIST

## 2021-09-14 ENCOUNTER — HOSPITAL ENCOUNTER (OUTPATIENT)
Dept: MAMMOGRAPHY | Facility: CLINIC | Age: 66
Discharge: HOME | End: 2021-09-14
Attending: FAMILY MEDICINE | Admitting: FAMILY MEDICINE
Payer: COMMERCIAL

## 2021-09-14 DIAGNOSIS — Z12.31 VISIT FOR SCREENING MAMMOGRAM: ICD-10-CM

## 2021-09-14 PROCEDURE — 77063 BREAST TOMOSYNTHESIS BI: CPT

## 2021-11-03 ENCOUNTER — OFFICE VISIT (OUTPATIENT)
Dept: OBGYN | Facility: CLINIC | Age: 66
End: 2021-11-03
Payer: COMMERCIAL

## 2021-11-03 VITALS
HEART RATE: 75 BPM | DIASTOLIC BLOOD PRESSURE: 72 MMHG | WEIGHT: 151 LBS | SYSTOLIC BLOOD PRESSURE: 124 MMHG | BODY MASS INDEX: 29.64 KG/M2 | HEIGHT: 60 IN | OXYGEN SATURATION: 95 % | TEMPERATURE: 97.9 F

## 2021-11-03 DIAGNOSIS — Z79.890 HORMONE REPLACEMENT THERAPY, POSTMENOPAUSAL: ICD-10-CM

## 2021-11-03 DIAGNOSIS — N95.0 POST-MENOPAUSAL BLEEDING: Primary | ICD-10-CM

## 2021-11-03 PROCEDURE — 99203 OFFICE O/P NEW LOW 30 MIN: CPT | Performed by: OBSTETRICS & GYNECOLOGY

## 2021-11-03 RX ORDER — TRAMADOL HYDROCHLORIDE 50 MG/1
50 TABLET ORAL EVERY 6 HOURS PRN
COMMUNITY

## 2021-11-03 ASSESSMENT — MIFFLIN-ST. JEOR: SCORE: 1153.49

## 2021-11-03 NOTE — PROGRESS NOTES
CC:  Consult from herself for postmenopausal bleeding.  HPI:  Shante Carrington is a 65 year old female is menopausal  No LMP recorded. Patient is postmenopausal.    She is on HRT with Estradiol and Prometrium.  She was seen in 2016 after having bleeding. She underwent a hysteroscopy and D&C of which the findings were benign.  She has tried to wean HRT in the past but unable to tolerate    Patients records are available and reviewed at today's visit.    Past GYN history:  No STD history       Last PAP smear:  Normal    Past Medical History:   Diagnosis Date     Hypothyroidism      Need for prophylactic hormone replacement therapy (postmenopausal)     estrogen and progesterone     PONV (postoperative nausea and vomiting)      Prediabetes      Rosacea      Spinal stenosis        Past Surgical History:   Procedure Laterality Date     DECOMPRESSION LUMBAR MINIMALLY INVASIVE TWO LEVELS N/A 10/6/2020    Procedure: Lumbar 4-5 bilateral decompression, Lumbar 5- Sacral 1 bilateral lateral decompression.;  Surgeon: Gary Silva MD;  Location: [facility] OR     DILATION AND CURETTAGE, OPERATIVE HYSTEROSCOPY, COMBINED N/A 8/1/2016    Procedure: COMBINED DILATION AND CURETTAGE, OPERATIVE HYSTEROSCOPY;  Surgeon: Rio Fernando MD;  Location: [facility] OR     ENT SURGERY         History reviewed. No pertinent family history.    Allergies: Percocet [oxycodone-acetaminophen], Zofran [ondansetron], Venlafaxine, and Zolpidem    Current Outpatient Medications   Medication Sig Dispense Refill     cetirizine (ZYRTEC) 10 MG tablet Take 10 mg by mouth daily       Cholecalciferol (VITAMIN D PO) Take 2,000 Units by mouth daily        citalopram (CELEXA) 40 MG tablet Take 20 mg by mouth every evening        ESTRADIOL PO Take 1 mg by mouth every evening        gabapentin (NEURONTIN) 300 MG capsule Take 600 mg by mouth At Bedtime        ibuprofen (ADVIL,MOTRIN) 200 MG tablet Take 3 tablets (600 mg) by mouth every 6 hours as needed for pain (mild)   "     levothyroxine (SYNTHROID, LEVOTHROID) 50 MCG tablet Take 50 mcg by mouth daily        OTHER MEDICAL SUPPLIES At Bedtime CPAP       progesterone (PROMETRIUM) 100 MG capsule Take 100 mg by mouth At Bedtime        traMADol (ULTRAM) 50 MG tablet Take 50 mg by mouth every 6 hours as needed for severe pain       traZODone (DESYREL) 100 MG tablet Take 1.5-2 tablets by mouth At Bedtime       Ascorbic Acid (VITAMIN C PO) Take 500 mg by mouth  (Patient not taking: Reported on 11/3/2021)       calcium carbonate (OS-DOUG 500 MG [place]. [place]) 500 MG tablet Take 500 mg by mouth 2 times daily (Patient not taking: Reported on 11/3/2021)       Multiple Vitamins-Minerals (DAILY MULTI VITAMIN/MINERALS PO) Take 1 tablet by mouth daily  (Patient not taking: Reported on 11/3/2021)       Omega-3 Fatty Acids (OMEGA-3 FISH OIL PO)  (Patient not taking: Reported on 11/3/2021)       omeprazole (PRILOSEC) 20 MG capsule Take 20 mg by mouth every evening  (Patient not taking: Reported on 11/3/2021)       senna-docusate (SENOKOT-S/PERICOLACE) 8.6-50 MG tablet Take 2 tablets by mouth 2 times daily (Patient not taking: Reported on 11/3/2021)         ROS:  C: NEGATIVE for fever, chills, change in weight  I: NEGATIVE for worrisome rashes, moles or lesions  E: NEGATIVE for vision changes or irritation  E/M: NEGATIVE for ear, mouth and throat problems  R: NEGATIVE for significant cough or SOB  CV: NEGATIVE for chest pain, palpitations or peripheral edema  GI: NEGATIVE for nausea, abdominal pain, heartburn, or change in bowel habits  : NEGATIVE for frequency, dysuria, hematuria, vaginal discharge  M: NEGATIVE for significant arthralgias or myalgia  N: NEGATIVE for weakness, dizziness or paresthesias  E: NEGATIVE for temperature intolerance, skin/hair changes  P: NEGATIVE for changes in mood or affect    EXAM:  Blood pressure 124/72, pulse 75, temperature 97.9  F (36.6  C), height 1.511 m (4' 11.5\"), weight 68.5 kg (151 lb), SpO2 95 %.   BMI= Body mass " index is 29.99 kg/m .  General - pleasant female in no acute distress.  Abdomen - soft, nontender, nondistended, no hepatosplenomegaly.  Pelvic - EG: normal adult female,   BUS: within normal limits,   Vagina: well rugated, brown  discharge,   Cervix: no lesions or CMT,   Uterus: firm, atrophic, mobile and nontender,   Adnexae: no masses or tenderness.  Rectovaginal - deferred.  Musculoskeletal - no gross deformities.  Neurological - normal strength, sensation, and mental status.    ULTRASOUND report reviewed (10/28/2021)  Also an ULTRASOUND from 2019- endometrium of 4 mm  ASSESSMENT/PLAN:  (N95.0) Post-menopausal bleeding  (primary encounter diagnosis)  Comment: she had a polyp in 2016 on hysteroscopy   2019 and current ULTRASOUND showed aendometrial stripe <=4 mm  Plan: observation    (Z79.890) Hormone replacement therapy, postmenopausal  Comment: she is protected by the prometrium  Plan: continue HRT        Letter will be sent to the referring provider.    Rio Fernando MD

## 2021-11-14 ENCOUNTER — HEALTH MAINTENANCE LETTER (OUTPATIENT)
Age: 66
End: 2021-11-14

## 2021-12-30 ENCOUNTER — TELEPHONE (OUTPATIENT)
Dept: OBGYN | Facility: CLINIC | Age: 66
End: 2021-12-30
Payer: COMMERCIAL

## 2021-12-30 NOTE — TELEPHONE ENCOUNTER
Panel Management Review        Health Maintenance List    Health Maintenance   Topic Date Due     PREVENTIVE CARE VISIT  Never done     ADVANCE CARE PLANNING  Never done     HIV SCREENING  Never done     HEPATITIS C SCREENING  Never done     LIPID  Never done     PAP  03/03/2019     PHQ-2  Never done     MAMMO SCREENING  09/14/2022     COLORECTAL CANCER SCREENING  11/22/2023     DTAP/TDAP/TD IMMUNIZATION (3 - Td or Tdap) 04/23/2029     INFLUENZA VACCINE  Completed     ZOSTER IMMUNIZATION  Completed     COVID-19 Vaccine  Completed     Pneumococcal Vaccine: Pediatrics (0 to 5 Years) and At-Risk Patients (6 to 64 Years)  Aged Out     IPV IMMUNIZATION  Aged Out     MENINGITIS IMMUNIZATION  Aged Out     HEPATITIS B IMMUNIZATION  Aged Out       Composite cancer screening  Chart review shows that this patient is due/due soon for the following Pap Smear  No results found for: PAP  Past Surgical History:   Procedure Laterality Date     DECOMPRESSION LUMBAR MINIMALLY INVASIVE TWO LEVELS N/A 10/6/2020    Procedure: Lumbar 4-5 bilateral decompression, Lumbar 5- Sacral 1 bilateral lateral decompression.;  Surgeon: Gary Silva MD;  Location: [facility] OR     DILATION AND CURETTAGE, OPERATIVE HYSTEROSCOPY, COMBINED N/A 8/1/2016    Procedure: COMBINED DILATION AND CURETTAGE, OPERATIVE HYSTEROSCOPY;  Surgeon: Rio Fernando MD;  Location: [facility] OR     ENT SURGERY         Is hysterectomy listed in surgical history? No   Is mastectomy listed in surgical history? No     Summary:    Patient is due/failing the following:   Pap Smear    Action needed: Patient needs office visit for annual and pap smear.    Type of outreach:  Sent [employer] message.      Staff Signature:  Rosalinda Jhaveri MA

## 2021-12-30 NOTE — LETTER
December 30, 2021      Shante Carrington  [address]  [place] [address]    Dear ,      This letter is to remind you that you are due for your follow up PAP smear and Annual Exam .    Please call [phone] to schedule your appointment at your earliest convenience.     If you have completed the tests outside of [facility], please have the results forwarded to our office. We will update the chart for your primary Physician to review before your next annual physical.     Sincerely,      Your [facility] Care Team

## 2022-05-18 ENCOUNTER — TELEPHONE (OUTPATIENT)
Dept: OBGYN | Facility: CLINIC | Age: 67
End: 2022-05-18
Payer: COMMERCIAL

## 2022-05-18 NOTE — TELEPHONE ENCOUNTER
Panel Management Review        Health Maintenance List    Health Maintenance   Topic Date Due     PREVENTIVE CARE VISIT  Never done     ADVANCE CARE PLANNING  Never done     HIV SCREENING  Never done     HEPATITIS C SCREENING  Never done     LIPID  Never done     PAP  03/03/2019     PHQ-2 (once per calendar year)  Never done     COVID-19 Vaccine (3 - Booster for Dan[place] series) 02/27/2022     MAMMO SCREENING  09/14/2022     COLORECTAL CANCER SCREENING  11/22/2023     DTAP/TDAP/TD IMMUNIZATION (3 - Td or Tdap) 04/23/2029     INFLUENZA VACCINE  Completed     ZOSTER IMMUNIZATION  Completed     Pneumococcal Vaccine: Pediatrics (0 to 5 Years) and At-Risk Patients (6 to 64 Years)  Aged Out     IPV IMMUNIZATION  Aged Out     MENINGITIS IMMUNIZATION  Aged Out     HEPATITIS B IMMUNIZATION  Aged Out       Composite cancer screening  Chart review shows that this patient is due/due soon for the following Pap Smear  No results found for: PAP  Past Surgical History:   Procedure Laterality Date     DECOMPRESSION LUMBAR MINIMALLY INVASIVE TWO LEVELS N/A 10/6/2020    Procedure: Lumbar 4-5 bilateral decompression, Lumbar 5- Sacral 1 bilateral lateral decompression.;  Surgeon: Gary Silva MD;  Location: [facility] OR     DILATION AND CURETTAGE, OPERATIVE HYSTEROSCOPY, COMBINED N/A 8/1/2016    Procedure: COMBINED DILATION AND CURETTAGE, OPERATIVE HYSTEROSCOPY;  Surgeon: Rio Fernando MD;  Location: [facility] OR     ENT SURGERY         Is hysterectomy listed in surgical history? No   Is mastectomy listed in surgical history? No     Summary:    Patient is due/failing the following:   Pap Smear    Action needed: pap    Type of outreach:  Sent [employer] message.      Staff Signature:  LEV BERNAL MA

## 2022-05-18 NOTE — LETTER
May 18, 2022      Shante Carrington  [address]  [place] [address]              Dear Shante,    To ensure we are providing the best quality care, we have reviewed your chart and see that you are due for:    Cervical Cancer Screening:    Please call Dept: [phone] to schedule an Annual Exam with Pap Smear.    If you have completed the tests outside of [facility], please have the results forwarded to our office Fax # [phone]. We will update the chart for your primary Physician to review before your next annual physical.    Thank you for trusting us with your health care.          Sincerely,      Rio Fernando MD
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
## 2022-08-23 ENCOUNTER — OFFICE VISIT (OUTPATIENT)
Dept: OBGYN | Facility: CLINIC | Age: 67
End: 2022-08-23
Payer: COMMERCIAL

## 2022-08-23 ENCOUNTER — TELEPHONE (OUTPATIENT)
Dept: OBGYN | Facility: CLINIC | Age: 67
End: 2022-08-23

## 2022-08-23 VITALS
DIASTOLIC BLOOD PRESSURE: 53 MMHG | WEIGHT: 134 LBS | TEMPERATURE: 98.3 F | BODY MASS INDEX: 26.31 KG/M2 | RESPIRATION RATE: 16 BRPM | SYSTOLIC BLOOD PRESSURE: 111 MMHG | HEIGHT: 60 IN | HEART RATE: 102 BPM

## 2022-08-23 DIAGNOSIS — N95.0 POSTMENOPAUSAL BLEEDING: Primary | ICD-10-CM

## 2022-08-23 PROCEDURE — 99213 OFFICE O/P EST LOW 20 MIN: CPT | Performed by: OBSTETRICS & GYNECOLOGY

## 2022-08-23 RX ORDER — SODIUM PHOSPHATE,MONO-DIBASIC 19G-7G/118
1 ENEMA (ML) RECTAL DAILY
COMMUNITY

## 2022-08-23 NOTE — PROGRESS NOTES
"Subjective:   Patient ID:  Isidro White Jr. is a 50 y.o. male who presents for cardiac consult of Chest Pain (hosp f/u [place]) and Congestive Heart Failure      Hypertension   Associated symptoms include chest pain and malaise/fatigue. Pertinent negatives include no palpitations or shortness of breath.   Chest Pain    Associated symptoms include malaise/fatigue. Pertinent negatives include no palpitations or shortness of breath.   His past medical history is significant for CHF.   Congestive Heart Failure   Associated symptoms include chest pain. Pertinent negatives include no palpitations or shortness of breath.     The patient came in today for cardiac consult of Chest Pain (hosp f/u [place]) and Congestive Heart Failure    Isidro White Jr. is a 50 y.o. male pt with ASD closure at age 9 ([facility]), NICM, s/p AVR with a 22 mm mechanical valve and TV annuloplasty with a 28 mm ring 3/17, HTN, SVT, EP performed RFA (3/13/17) and has been on HD - MWF since Feb 8,2016 since his surgery, referred by Dr Hurst (Nephrology) for evaluation and management of PH as he is being considered for kidney transplant, seen by Dr. Braun in [facility].     8/9/28  He had a RHC 6/18 showing normal cardiac output, normal filling pressures and MILD PH with mean pap 32 mmHg.  Pt feels very weak on HD days. After RHC HR has been in 109-110 range, started on cardizem. HR now normal. Pt felt palpitations now feels ok.     12/13/18  Holter from 9/4/18 revealed freq PVCs, dilt increased to 240 mg. No palpitations. Has been doing well lately, BP is well controlled. Labwork from HD has been normal. Still makes some urine, says he has some hematuria. Will need annual stress and 2D echo. He is also undergoing workup for HepB will see Dr. Youngblood.     3/15/19  Recent ER eval at [facility] - He presented with chest pain and shortness of breath. He is a dialysis patient has a history of anemia. He states "I believe that the " CC:  Follow up  from her PCP  for postmenopausal bleeding  HPI:  Shante Carrington is a 66 year old female is menopausal on estradiol and progesterone for symptomatic menopause  No LMP recorded. Patient is postmenopausal. she has an ULTRASOUND from [facility] that indicates that the endometrium is thickened.  Her PCP wanted her to get an endometrial biopsy with an [facility] provider. She came to see me.      Patients records are available and reviewed at today's visit.    Past GYN history:  No STD history       Last PAP smear:  Normal      Past Medical History:   Diagnosis Date     Hypothyroidism      Need for prophylactic hormone replacement therapy (postmenopausal)     estrogen and progesterone     PONV (postoperative nausea and vomiting)      Prediabetes      Rosacea      Spinal stenosis        Past Surgical History:   Procedure Laterality Date     DECOMPRESSION LUMBAR MINIMALLY INVASIVE TWO LEVELS N/A 10/6/2020    Procedure: Lumbar 4-5 bilateral decompression, Lumbar 5- Sacral 1 bilateral lateral decompression.;  Surgeon: Gary Silva MD;  Location: [facility] OR     DILATION AND CURETTAGE, OPERATIVE HYSTEROSCOPY, COMBINED N/A 8/1/2016    Procedure: COMBINED DILATION AND CURETTAGE, OPERATIVE HYSTEROSCOPY;  Surgeon: Rio Fernando MD;  Location: [facility] OR     ENT SURGERY         History reviewed. No pertinent family history.    Allergies: Percocet [oxycodone-acetaminophen], Zofran [ondansetron], Venlafaxine, and Zolpidem    Current Outpatient Medications   Medication Sig Dispense Refill     Ascorbic Acid (VITAMIN C PO) Take 500 mg by mouth       calcium carbonate (OS-DOUG 500 MG [place]. [place]) 500 MG tablet Take 500 mg by mouth 2 times daily       cetirizine (ZYRTEC) 10 MG tablet Take 10 mg by mouth daily       Cholecalciferol (VITAMIN D PO) Take 2,000 Units by mouth daily        citalopram (CELEXA) 40 MG tablet Take 20 mg by mouth every evening        Cyanocobalamin (B-12) 1000 MCG TBCR        ESTRADIOL PO Take 1 mg by mouth  "every evening        gabapentin (NEURONTIN) 300 MG capsule Take 600 mg by mouth At Bedtime        glucosamine-chondroitin 500-400 MG CAPS per capsule Take 1 capsule by mouth daily       ibuprofen (ADVIL,MOTRIN) 200 MG tablet Take 3 tablets (600 mg) by mouth every 6 hours as needed for pain (mild)       levothyroxine (SYNTHROID, LEVOTHROID) 50 MCG tablet Take 50 mcg by mouth daily        Omega-3 Fatty Acids (OMEGA-3 FISH OIL PO)        omeprazole (PRILOSEC) 20 MG capsule Take 20 mg by mouth every evening       progesterone (PROMETRIUM) 100 MG capsule Take 100 mg by mouth At Bedtime        senna-docusate (SENOKOT-S/PERICOLACE) 8.6-50 MG tablet Take 2 tablets by mouth 2 times daily       traMADol (ULTRAM) 50 MG tablet Take 50 mg by mouth every 6 hours as needed for severe pain       traZODone (DESYREL) 100 MG tablet Take 2 tablets by mouth At Bedtime       Multiple Vitamins-Minerals (DAILY MULTI VITAMIN/MINERALS PO) Take 1 tablet by mouth daily  (Patient not taking: No sig reported)       OTHER MEDICAL SUPPLIES At Bedtime CPAP (Patient not taking: Reported on 8/23/2022)         ROS:  C: NEGATIVE for fever, chills, change in weight  I: NEGATIVE for worrisome rashes, moles or lesions  E: NEGATIVE for vision changes or irritation  E/M: NEGATIVE for ear, mouth and throat problems  R: NEGATIVE for significant cough or SOB  CV: NEGATIVE for chest pain, palpitations or peripheral edema  GI: NEGATIVE for nausea, abdominal pain, heartburn, or change in bowel habits  : NEGATIVE for frequency, dysuria, hematuria, vaginal discharge  M: NEGATIVE for significant arthralgias or myalgia  N: NEGATIVE for weakness, dizziness or paresthesias  E: NEGATIVE for temperature intolerance, skin/hair changes  P: NEGATIVE for changes in mood or affect    EXAM:  Blood pressure 111/53, pulse 102, temperature 98.3  F (36.8  C), resp. rate 16, height 1.511 m (4' 11.5\"), weight 60.8 kg (134 lb).   BMI= Body mass index is 26.61 kg/m .  General - " "lack of oxygen". Patient has received blood transfusions in the past but not recently. Patient had dialysis yesterday and is a Monday Wednesday Friday dialysis patient. His troponin is mildly elevated. His chest x-ray is relatively clear. We do not see a large effusion and there is no obvious infiltrate at this time. Patient states at times he has chest pain and it feels like he is smothering. The safest plan is to admit the patient and repeat his cardiac enzymes and consider given the patient a blood transfusion. However the patient is a dialysis patient so we are unable to admit him at this facility. Patient nephrologist is an [facility] and [facility]  Pt Left AMA and now here for follow up.   Pt has SOB usually at night, feels suffocated and can hear himself snore and wakes up. No prior sleep study.      Patient feels no chest pain, no leg swelling, no PND, no palpitation, no dizziness, no syncope, no CNS symptoms.    Patient has fairly good exercise tolerance.    Patient is compliant with medications.      8/9/18 ECG - NSR, PVCs, V rate 77, lateral TWI      2/18 Stress test  · The perfusion scan is free of evidence from myocardial ischemia or injury.  · An ejection fraction of 75 % at rest  · Resting wall motion is physiologic.  · Post stress wall motion is physiologic.  · LV cavity size is normal at rest.  · There was no ST segment deviation noted during stress.  · The EKG portion of this study is negative for myocardial ischemia.  · Arrhythmias during stress: rare PVCs.  · The patient reported SOB (non-anginal) during the stress test.  · There is no prior study available for comparison.        9/4/18 HOLTER  TEST DESCRIPTION   PREDOMINANT RHYTHM  1. Sinus rhythm with heart rates varying between 69 and 124 bpm with an average of 85 bpm.   VENTRICULAR ARRHYTHMIAS  1. There were frequent PVCs totalling 1738 and averaging 36 per hour.  There were 9 bigeminal cycles.  There were 3 triplets. "   2. There was one (6 beat) run of non-sustained ventricular tachycardia. The rate was 125 bpm.     SUPRA VENTRICULAR ARRHYTHMIAS  1. There were occasional PACs totalling 995 and averaging 20 per hour.  There were 9 triplets.   2. There were 4 (12 beat) runs of non-sustained SVT. The rate was 145 bpm.           Echo 2/25/18 at [facility]   A large left and a left pleural effusion was visualized.  · Aortic Valve: There is a mechanical valve present. Prosthetic Aortic   Valve function is normal.  · Tricuspid Valve: Repaired tricuspid valve with annuloplasty ring. Mild   tricuspid regurgitation. PAP 44 There is an annular ring prosthesis   present.  · Left Ventricle: Normal left ventricle cavity size. Low normal (50-55%)   ejection fraction. Concentric hypertrophy observed.         Cath 2/17  1. Coronary angiography.      a.     Left main widely patent.      b.     LAD widely patent.      c.     Ramus widely patent and massive in size.      d.     Circumflex widely patent with a small OM 1 and OM 2 branch.      e.     Right coronary widely patent.  2. Hemodynamics:  Right heart catheterization.      a.     Pulmonary capillary wedge pressure 33 at end-expiration.      b.     Pulmonary artery pressure 78/42.      c.     Right ventricular pressure 70/21.      d.     Right atrial pressure 22 with a peak V-wave of 24.      e.     Cardiac output by thermodilution is between 6 and 7       L/minute, by Patricia 6.1 L/minute.    CONCLUSION  1. Normal coronary arteries.  2. Pulmonary hypertension.  3. Severe aortic insufficiency.      2D ECHO 6/20/17  CONCLUSIONS     1 - Concentric hypertrophy.     2 - No wall motion abnormalities.     3 - Normal left ventricular systolic function (EF 55-60%).     4 - Biatrial enlargement.     5 - Restrictive LV filling pattern, indicating markedly elevated LAP (grade 3 diastolic dysfunction).     6 - Right ventricular enlargement with hypertrophy, with mildly depressed systolic function.     7 -  pleasant female in no acute distress.  No exam today     ASSESSMENT/PLAN:  (N95.0) Postmenopausal bleeding  (primary encounter diagnosis)  Comment: ULTRASOUND report reviewed  She has the protection of progesterone as she is on a low dose of estradiol.  She has had a polyp in the past hat was not resolved by an endometrial biopsy.  Plan: Case Request: HYSTEROSCOPY, DIAGNOSTIC, WITH         DILATION AND CURETTAGE OF UTERUS          I described the procedures as indicated above. The types of anesthesia were reviewed. The pre and post-op expectations were noted. We discussed the risks and benefits of the above procedures. The risk of bleeding, infection and damage to other organs was reviewed. The possibility of much larger incision(s) was discussed.  No guarantees were made.  She did express understanding and desires to proceed with surgery.        Letter will be sent to the referring provider.    Rio Fernando MD  15 of 15 minutes spent Face to face counseling relative to the issues noted above.     Pulmonary hypertension. The estimated PA systolic pressure is greater than 68 mmHg.     8 - Aortic valve prosthesis, effective prosthetic valve area corrected for BSA is 0.7 cm2.     9 - Moderate tricuspid regurgitation.     10 - Small pericardial effusion.     11 - There is an increased density to the myocardium, suggesting a myocardial infiltrative process (clinical correlation required).     12 -  LV GLS is low, indicating impaired LV contractility.  Clinical correlation is required..       Past Medical History:   Diagnosis Date    Aortic valve stenosis     s/p mechanical AVR    Atrial fibrillation     Atrial flutter     Cardiomyopathy     CHF (congestive heart failure)     Drug-induced erectile dysfunction     ESRD due to hypertension     HD M, W, F    ESRD on dialysis     Hepatitis B     Hyperlipidemia     Hypertension     Secondary hyperparathyroidism of renal origin     Supraventricular tachycardia     atrial tachycardia    Tachycardia     Valvular regurgitation     AI, TR       Past Surgical History:   Procedure Laterality Date    ABLATION N/A 3/13/2017    Performed by Nigel Guallpa [facility] at [facility] CATH LAB    ASD REPAIR      age 9 [facility]    AV Graft Creation Left 03/2017    CARDIAC CATHETERIZATION      [facility]     CARDIAC VALVE SURGERY  02/08/2017    22 mm Medtronic AV, 28 mm TV Medtronic annuloplaty ring    [id number] Accuseal Left 3/16/2017    Performed by Alcides Manning MD at [facility] OR 2ND FLR    RADIOFREQUENCY ABLATION  03/13/2017    Atrial tachycardia    REDO STERNOTOMY WITH AORTIC VALVE REPLACEMENT/redo sternotomy N/A 2/8/2017    Performed by Jose Gastelum MD at [facility] OR 2ND FLR    REPAIR-VALVE-TRICUSPID  2/8/2017    Performed by [facility]um MD at [facility] OR 2ND FLR    REPLACEMENT-VALVE-AORTIC N/A 2/8/2017    Performed by Jose [facility]elum MD at [facility] OR 2ND FLR       Social History     Tobacco Use    Smoking status: Never Smoker     Smokeless tobacco: Never Used   Substance Use Topics    Alcohol use: No     Frequency: Never     Comment: quit in 2016; does have heavy drinking history; started drinking at age 14; was drinking a 12 pack over the weekend and two 24 ounces of beer a day during the week along with a pint of hard alcohol    Drug use: No       Family History   Problem Relation Age of Onset    Leukemia Mother     Heart attack Father         massive MI at age 67    No Known Problems Sister     No Known Problems Brother     No Known Problems Sister     No Known Problems Sister     Heart failure Paternal Grandmother     Diabetes Paternal Aunt     Hypertension Paternal Aunt     Leukemia Paternal Aunt         CLL    Suicide Paternal Uncle     Anesthesia problems Paternal Uncle     Diabetes Paternal Aunt     Stroke Paternal Aunt     Valvular heart disease Maternal Aunt     Kidney disease Neg Hx     Colon cancer Neg Hx        Patient's Medications   New Prescriptions    No medications on file   Previous Medications    AMLODIPINE (NORVASC) 10 MG TABLET    Take 1 tablet (10 mg total) by mouth once daily.    ASCORBIC ACID, VITAMIN C, (VITAMIN C) 500 MG TABLET    Take 1 tablet (500 mg total) by mouth 2 (two) times daily.    B COMPLEX VITAMINS TABLET    Take 1 tablet by mouth once daily.    CALCIUM ACETATE (PHOSLO) 667 MG CAPSULE    Take 1 capsule (667 mg total) by mouth 3 (three) times daily.    DILTIAZEM (CARDIZEM CD) 360 MG 24 HR CAPSULE    Take 1 capsule (360 mg total) by mouth once daily.    EPOETIN TORRIE (PROCRIT) 10,000 UNIT/ML INJECTION    Inject 1 mL (10,000 Units total) into the skin every Mon, Wed, Fri. To be given with HD    FUROSEMIDE (LASIX) 80 MG TABLET    TAKE ONE TABLET BY MOUTH EVERY DAY    HYDROXYZINE HCL (ATARAX) 25 MG TABLET    Take 1 tablet (25 mg total) by mouth 3 (three) times daily as needed for Itching.    LABETALOL (NORMODYNE) 100 MG TABLET    Take 100 mg by mouth once daily. If heart rate greater than  "120    LISINOPRIL (PRINIVIL,ZESTRIL) 40 MG TABLET    TAKE ONE TABLET BY MOUTH EVERY DAY    MULTIVITAMIN WITH MINERALS TABLET    Take 1 tablet by mouth once daily.    OMEGA-3 FATTY ACIDS-VITAMIN E (FISH OIL) 1,000 MG CAP    Take 1 capsule by mouth once daily.    PANTOPRAZOLE (PROTONIX) 40 MG TABLET    TAKE ONE TABLET BY MOUTH EVERY DAY    ETHAN-RACQUEL RX 1- MG-MG-MCG TAB    TAKE ONE TABLET BY MOUTH EVERY DAY    TENOFOVIR (VIREAD) 300 MG TAB    Take 1 tablet (300 mg total) by mouth once a week. AFTER DIALYSIS    WARFARIN (COUMADIN) 7.5 MG TABLET    Take 1 tablet (7.5 mg total) by mouth Daily.    ZOLPIDEM (AMBIEN) 5 MG TAB    TAKE ONE TABLET BY MOUTH EACH EVENING AT BEDTIME AS NEEDED FOR INSOMNIA   Modified Medications    No medications on file   Discontinued Medications    METOPROLOL SUCCINATE (TOPROL-XL) 200 MG 24 HR TABLET    TAKE ONE TABLET BY MOUTH EVERY DAY       Review of Systems   Constitutional: Positive for malaise/fatigue.   HENT: Negative.    Eyes: Negative.    Respiratory: Negative for shortness of breath.    Cardiovascular: Positive for chest pain. Negative for palpitations.   Gastrointestinal: Negative.    Genitourinary: Positive for hematuria.   Musculoskeletal: Negative.    Skin: Negative.    Neurological: Negative.    Endo/Heme/Allergies: Negative.    Psychiatric/Behavioral: Negative.    All 12 systems otherwise negative.      Wt Readings from Last 3 Encounters:   03/15/19 98 kg (216 lb)   02/28/19 98 kg (216 lb 0.8 oz)   02/14/19 99.8 kg (220 lb 0.3 oz)     Temp Readings from Last 3 Encounters:   01/08/19 98.1 °F (36.7 °C) (Oral)   09/21/17 99 °F (37.2 °C)   03/19/17 98.6 °F (37 °C) (Oral)     BP Readings from Last 3 Encounters:   03/15/19 130/76   02/28/19 (!) 154/80   02/14/19 (!) 136/21     Pulse Readings from Last 3 Encounters:   03/15/19 84   02/28/19 84   02/14/19 80       /76 (BP Method: Large (Manual))   Pulse 84   Ht 6' 2" (1.88 m)   Wt 98 kg (216 lb)   BMI 27.73 kg/m²  BP " 122/72    Objective:   Physical Exam   Constitutional: He is oriented to person, place, and time. He appears well-developed and well-nourished. No distress.   HENT:   Head: Normocephalic and atraumatic.   Nose: Nose normal.   Mouth/Throat: Oropharynx is clear and moist.   Eyes: Conjunctivae and EOM are normal. No scleral icterus.   Neck: Normal range of motion. Neck supple. No JVD present. No thyromegaly present.   Cardiovascular: Normal rate, regular rhythm, S1 normal and S2 normal. Exam reveals no gallop, no S3, no S4 and no friction rub.   Murmur heard.   Midsystolic murmur is present at the upper right sternal border.  [place] S2   Pulmonary/Chest: Effort normal and breath sounds normal. No stridor. No respiratory distress. He has no wheezes. He has no rales. He exhibits no tenderness.   Abdominal: Soft. Bowel sounds are normal. He exhibits no distension and no mass. There is no tenderness. There is no rebound.   Genitourinary:   Genitourinary Comments: Deferred   Musculoskeletal: Normal range of motion. He exhibits no edema, tenderness or deformity.   Lymphadenopathy:     He has no cervical adenopathy.   Neurological: He is alert and oriented to person, place, and time. He exhibits normal muscle tone. Coordination normal.   Skin: Skin is warm and dry. No rash noted. He is not diaphoretic. No erythema. No pallor.   Psychiatric: He has a normal mood and affect. His behavior is normal. Judgment and thought content normal.   Nursing note and vitals reviewed.      Lab Results   Component Value Date     01/08/2019    K 4.4 01/08/2019     01/08/2019    CO2 28 01/08/2019    BUN 73 (H) 01/08/2019    CREATININE 13.1 (H) 01/08/2019    GLU 88 01/08/2019    MG 2.1 05/15/2018    AST 64 (H) 02/05/2019     (H) 02/05/2019    ALBUMIN 3.1 (L) 02/05/2019    PROT 8.6 (H) 02/05/2019    BILITOT 0.7 02/05/2019    WBC 5.36 01/08/2019    HGB 10.4 (L) 01/08/2019    HCT 30.8 (L) 01/08/2019    HCT 25 (L) 02/08/2017    MCV  106 (H) 01/08/2019     (L) 01/08/2019    INR 1.5 (H) 01/08/2019    CHOL 213 (H) 01/08/2019    HDL 50 01/08/2019    LDLCALC 142.4 01/08/2019    TRIG 103 01/08/2019    BNP 1,292 (H) 03/09/2017     Assessment:      1. Chronic combined systolic and diastolic heart failure    2. S/P AVR (aortic valve replacement)    3. Nonischemic cardiomyopathy    4. Adult congenital heart disease    5. Nonrheumatic aortic valve stenosis    6. Hypertensive cardiomegaly with heart failure    7. Essential hypertension    8. History of radiofrequency ablation for complex right atrial arrhythmia    9. ESRD on dialysis    10. Anemia of chronic disease    11. Sleep apnea, unspecified type        Plan:   1. Chronic Diastolic HF  - recovered EF  - cont low salt diet  - pt euvolemic     2. S/p AVR  - cont coumadin  - f/u with coumadin clinic at [facility]    3. ESRD  - cont HD  - stable CV status for transplant, recent stress/echo negative    4. HTN  - cont meds    6. Tachycardia sec to PVCs  - cont BB and CCB    7. HLD  - cont statin    8. Sleep apnea symptoms  - refer to sleep study    Thank you for allowing me to participate in this patient's care. Please do not hesitate to contact me with any questions or concerns. Consult note has been forwarded to the referral physician.

## 2022-08-23 NOTE — TELEPHONE ENCOUNTER
"[id number]  Shante JUN Carrington    You are now scheduled for surgery at [facility].  Below are the details for your surgery.  Please read the \"Preparing for Your Surgery\" instructions and let us know if you have any questions.    Type of surgery: HYSTEROSCOPY, DIAGNOSTIC, WITH DILATION AND CURETTAGE OF UTERUS    Surgeon:  Rio Fernando MD  Location of surgery: [facility] OR    Date of surgery: 9-26-22    Time: 10:00am   Arrival Time: 9:00am    Time can change, to be confirmed a couple of days prior by pre-op surgery nurse.    Pre-Op Appt Date: Patient to schedule with a PCP or Family Practice Provider within 30 days to the surgery.  Post-Op Appt Date: To be determined by provider     *For overnight Surgery - PCR Covid-19 test from a lab required 2-4 days prior.  See \"testing for Covid-19 handout\"    *For Same Day Surgery Covid-19 test required 1-2 days prior.  See \"testing for Covid-19 handout\"    Packet sent out: Yes  Pre-cert/Authorization completed:  TBD by Financial Securing Office.   MA Sterilization/Hysterectomy Acknowledgment Consent signed: Not Applicable    [facility]  [phone]    Fax: [phone]  Same Day Surgery [phone]  Fax: [phone]  [facility] [phone]    "

## 2022-09-23 ENCOUNTER — ANESTHESIA EVENT (OUTPATIENT)
Dept: SURGERY | Facility: CLINIC | Age: 67
End: 2022-09-23
Payer: COMMERCIAL

## 2022-09-23 NOTE — ANESTHESIA PREPROCEDURE EVALUATION
Anesthesia Pre-Procedure Evaluation    Patient: Shante Carrington   MRN: [id number] : 1955        Procedure : Procedure(s):  HYSTEROSCOPY, DIAGNOSTIC, WITH DILATION AND CURETTAGE OF UTERUS          Past Medical History:   Diagnosis Date     Hypothyroidism      Need for prophylactic hormone replacement therapy (postmenopausal)     estrogen and progesterone     PONV (postoperative nausea and vomiting)      Prediabetes      Rosacea      Spinal stenosis       Past Surgical History:   Procedure Laterality Date     DECOMPRESSION LUMBAR MINIMALLY INVASIVE TWO LEVELS N/A 10/6/2020    Procedure: Lumbar 4-5 bilateral decompression, Lumbar 5- Sacral 1 bilateral lateral decompression.;  Surgeon: Gary Silva MD;  Location: [facility] OR     DILATION AND CURETTAGE, OPERATIVE HYSTEROSCOPY, COMBINED N/A 2016    Procedure: COMBINED DILATION AND CURETTAGE, OPERATIVE HYSTEROSCOPY;  Surgeon: Rio Fernando MD;  Location: [facility] OR     ENT SURGERY        Allergies   Allergen Reactions     Percocet [Oxycodone-Acetaminophen] Other (See Comments)     hallucinations     Zofran [Ondansetron] Other (See Comments)     headache     Venlafaxine Anxiety and Other (See Comments)     MIND RACING     Zolpidem Anxiety     Interacted with antidepressant and REM sleep      Social History     Tobacco Use     Smoking status: Never Smoker     Smokeless tobacco: Never Used   Substance Use Topics     Alcohol use: Yes     Comment: rarely      Wt Readings from Last 1 Encounters:   22 60.8 kg (134 lb)        Anesthesia Evaluation   Pt has had prior anesthetic. Type: General and MAC.    History of anesthetic complications  - PONV.      ROS/MED HX  ENT/Pulmonary:       Neurologic:       Cardiovascular:       METS/Exercise Tolerance:     Hematologic:       Musculoskeletal: Comment: Spinal stenosis  Hx lumbar decompression    (+) arthritis,     GI/Hepatic:       Renal/Genitourinary:       Endo:     (+) thyroid problem, hypothyroidism,      Psychiatric/Substance Use:       Infectious Disease:       Malignancy:       Other:            Physical Exam    Airway        Mallampati: II   TM distance: > 3 FB   Neck ROM: full   Mouth opening: > 3 cm    Respiratory Devices and Support  Comment: Not on oxygen currently       Dental  no notable dental history         Cardiovascular   cardiovascular exam normal          Pulmonary   pulmonary exam normal                OUTSIDE LABS:  CBC: No results found for: WBC, HGB, HCT, PLT  BMP: No results found for: NA, POTASSIUM, CHLORIDE, CO2, BUN, CR, GLC  COAGS: No results found for: PTT, INR, FIBR  POC: No results found for: BGM, HCG, HCGS  HEPATIC: No results found for: ALBUMIN, PROTTOTAL, ALT, AST, GGT, ALKPHOS, BILITOTAL, BILIDIRECT, ASMITA  OTHER: No results found for: PH, LACT, A1C, DOUG, PHOS, MAG, LIPASE, AMYLASE, TSH, T4, T3, CRP, SED    Anesthesia Plan    ASA Status:  3      Anesthesia Type: General.   Induction: Intravenous, Propofol.   Maintenance: TIVA.        Consents    Anesthesia Plan(s) and associated risks, benefits, and realistic alternatives discussed. Questions answered and patient/representative(s) expressed understanding.     - Discussed: Risks, Benefits and Alternatives for BOTH SEDATION and the PROCEDURE were discussed     - Discussed with:  Patient         Postoperative Care    Pain management: IV analgesics, Oral pain medications, Multi-modal analgesia.   PONV prophylaxis: Ondansetron (or other 5HT-3), Dexamethasone or Solumedrol     Comments:    Other Comments: Patient aware of plan, what to expect, and potential risks. Timeout was performed before bringing the patient back to OR, and once again, patient was asked if they had any questions            BALA Smith CRNA

## 2022-09-26 ENCOUNTER — HOSPITAL ENCOUNTER (OUTPATIENT)
Facility: CLINIC | Age: 67
Discharge: HOME | End: 2022-09-26
Attending: OBSTETRICS & GYNECOLOGY | Admitting: OBSTETRICS & GYNECOLOGY
Payer: COMMERCIAL

## 2022-09-26 ENCOUNTER — ANESTHESIA (OUTPATIENT)
Dept: SURGERY | Facility: CLINIC | Age: 67
End: 2022-09-26
Payer: COMMERCIAL

## 2022-09-26 VITALS
WEIGHT: 134 LBS | TEMPERATURE: 97.4 F | HEIGHT: 60 IN | BODY MASS INDEX: 26.31 KG/M2 | DIASTOLIC BLOOD PRESSURE: 60 MMHG | HEART RATE: 67 BPM | SYSTOLIC BLOOD PRESSURE: 126 MMHG | OXYGEN SATURATION: 94 %

## 2022-09-26 DIAGNOSIS — Z98.890 S/P D&C (STATUS POST DILATION AND CURETTAGE): Primary | ICD-10-CM

## 2022-09-26 PROCEDURE — 258N000003 HC RX IP 258 OP 636: Performed by: NURSE ANESTHETIST, CERTIFIED REGISTERED

## 2022-09-26 PROCEDURE — 272N000001 HC OR GENERAL SUPPLY STERILE: Performed by: OBSTETRICS & GYNECOLOGY

## 2022-09-26 PROCEDURE — 250N000009 HC RX 250: Performed by: NURSE ANESTHETIST, CERTIFIED REGISTERED

## 2022-09-26 PROCEDURE — 360N000076 HC SURGERY LEVEL 3, PER MIN: Performed by: OBSTETRICS & GYNECOLOGY

## 2022-09-26 PROCEDURE — 370N000017 HC ANESTHESIA TECHNICAL FEE, PER MIN: Performed by: OBSTETRICS & GYNECOLOGY

## 2022-09-26 PROCEDURE — 250N000013 HC RX MED GY IP 250 OP 250 PS 637: Performed by: OBSTETRICS & GYNECOLOGY

## 2022-09-26 PROCEDURE — 88305 TISSUE EXAM BY PATHOLOGIST: CPT | Mod: 26 | Performed by: PATHOLOGY

## 2022-09-26 PROCEDURE — 58558 HYSTEROSCOPY BIOPSY: CPT | Performed by: OBSTETRICS & GYNECOLOGY

## 2022-09-26 PROCEDURE — 710N000012 HC RECOVERY PHASE 2, PER MINUTE: Performed by: OBSTETRICS & GYNECOLOGY

## 2022-09-26 PROCEDURE — 250N000011 HC RX IP 250 OP 636: Performed by: OBSTETRICS & GYNECOLOGY

## 2022-09-26 PROCEDURE — 271N000001 HC OR GENERAL SUPPLY NON-STERILE: Performed by: OBSTETRICS & GYNECOLOGY

## 2022-09-26 PROCEDURE — 250N000013 HC RX MED GY IP 250 OP 250 PS 637: Performed by: NURSE ANESTHETIST, CERTIFIED REGISTERED

## 2022-09-26 PROCEDURE — 250N000009 HC RX 250: Performed by: OBSTETRICS & GYNECOLOGY

## 2022-09-26 PROCEDURE — 250N000009 HC RX 250

## 2022-09-26 PROCEDURE — 88305 TISSUE EXAM BY PATHOLOGIST: CPT | Mod: TC | Performed by: OBSTETRICS & GYNECOLOGY

## 2022-09-26 PROCEDURE — 999N000141 HC STATISTIC PRE-PROCEDURE NURSING ASSESSMENT: Performed by: OBSTETRICS & GYNECOLOGY

## 2022-09-26 PROCEDURE — 250N000011 HC RX IP 250 OP 636

## 2022-09-26 RX ORDER — IBUPROFEN 200 MG
800 TABLET ORAL EVERY 6 HOURS PRN
COMMUNITY
Start: 2022-09-26

## 2022-09-26 RX ORDER — ACETAMINOPHEN 325 MG/1
975 TABLET ORAL ONCE
Status: DISCONTINUED | OUTPATIENT
Start: 2022-09-26 | End: 2022-09-26

## 2022-09-26 RX ORDER — CEFAZOLIN SODIUM/WATER 2 G/20 ML
2 SYRINGE (ML) INTRAVENOUS
Status: COMPLETED | OUTPATIENT
Start: 2022-09-26 | End: 2022-09-26

## 2022-09-26 RX ORDER — FENTANYL CITRATE 50 UG/ML
25 INJECTION, SOLUTION INTRAMUSCULAR; INTRAVENOUS EVERY 5 MIN PRN
Status: DISCONTINUED | OUTPATIENT
Start: 2022-09-26 | End: 2022-09-26 | Stop reason: HOSPADM

## 2022-09-26 RX ORDER — IBUPROFEN 800 MG/1
800 TABLET, FILM COATED ORAL ONCE
Status: DISCONTINUED | OUTPATIENT
Start: 2022-09-26 | End: 2022-09-26 | Stop reason: HOSPADM

## 2022-09-26 RX ORDER — PROPOFOL 10 MG/ML
INJECTION, EMULSION INTRAVENOUS CONTINUOUS PRN
Status: DISCONTINUED | OUTPATIENT
Start: 2022-09-26 | End: 2022-09-26

## 2022-09-26 RX ORDER — ACETAMINOPHEN 325 MG/1
975 TABLET ORAL EVERY 6 HOURS PRN
Qty: 50 TABLET | Refills: 0 | Status: SHIPPED | OUTPATIENT
Start: 2022-09-26

## 2022-09-26 RX ORDER — BUPIVACAINE HYDROCHLORIDE 5 MG/ML
INJECTION, SOLUTION PERINEURAL PRN
Status: DISCONTINUED | OUTPATIENT
Start: 2022-09-26 | End: 2022-09-26 | Stop reason: HOSPADM

## 2022-09-26 RX ORDER — FENTANYL CITRATE 0.05 MG/ML
INJECTION, SOLUTION INTRAMUSCULAR; INTRAVENOUS PRN
Status: DISCONTINUED | OUTPATIENT
Start: 2022-09-26 | End: 2022-09-26

## 2022-09-26 RX ORDER — MAGNESIUM SULFATE HEPTAHYDRATE 40 MG/ML
2 INJECTION, SOLUTION INTRAVENOUS ONCE
Status: DISCONTINUED | OUTPATIENT
Start: 2022-09-26 | End: 2022-09-26 | Stop reason: HOSPADM

## 2022-09-26 RX ORDER — NALOXONE HYDROCHLORIDE 0.4 MG/ML
0.4 INJECTION, SOLUTION INTRAMUSCULAR; INTRAVENOUS; SUBCUTANEOUS
Status: DISCONTINUED | OUTPATIENT
Start: 2022-09-26 | End: 2022-09-26 | Stop reason: HOSPADM

## 2022-09-26 RX ORDER — KETAMINE HYDROCHLORIDE 10 MG/ML
INJECTION INTRAMUSCULAR; INTRAVENOUS PRN
Status: DISCONTINUED | OUTPATIENT
Start: 2022-09-26 | End: 2022-09-26

## 2022-09-26 RX ORDER — ONDANSETRON 4 MG/1
4 TABLET, ORALLY DISINTEGRATING ORAL EVERY 30 MIN PRN
Status: DISCONTINUED | OUTPATIENT
Start: 2022-09-26 | End: 2022-09-26 | Stop reason: HOSPADM

## 2022-09-26 RX ORDER — SODIUM CHLORIDE, SODIUM LACTATE, POTASSIUM CHLORIDE, CALCIUM CHLORIDE 600; 310; 30; 20 MG/100ML; MG/100ML; MG/100ML; MG/100ML
INJECTION, SOLUTION INTRAVENOUS CONTINUOUS
Status: DISCONTINUED | OUTPATIENT
Start: 2022-09-26 | End: 2022-09-26 | Stop reason: HOSPADM

## 2022-09-26 RX ORDER — GABAPENTIN 300 MG/1
300 CAPSULE ORAL
Status: COMPLETED | OUTPATIENT
Start: 2022-09-26 | End: 2022-09-26

## 2022-09-26 RX ORDER — ACETAMINOPHEN 325 MG/1
975 TABLET ORAL ONCE
Status: COMPLETED | OUTPATIENT
Start: 2022-09-26 | End: 2022-09-26

## 2022-09-26 RX ORDER — LIDOCAINE 40 MG/G
CREAM TOPICAL
Status: DISCONTINUED | OUTPATIENT
Start: 2022-09-26 | End: 2022-09-26 | Stop reason: HOSPADM

## 2022-09-26 RX ORDER — OXYCODONE HYDROCHLORIDE 5 MG/1
5 TABLET ORAL EVERY 4 HOURS PRN
Status: DISCONTINUED | OUTPATIENT
Start: 2022-09-26 | End: 2022-09-26 | Stop reason: HOSPADM

## 2022-09-26 RX ORDER — NALOXONE HYDROCHLORIDE 0.4 MG/ML
0.2 INJECTION, SOLUTION INTRAMUSCULAR; INTRAVENOUS; SUBCUTANEOUS
Status: DISCONTINUED | OUTPATIENT
Start: 2022-09-26 | End: 2022-09-26 | Stop reason: HOSPADM

## 2022-09-26 RX ORDER — ONDANSETRON 2 MG/ML
4 INJECTION INTRAMUSCULAR; INTRAVENOUS EVERY 30 MIN PRN
Status: DISCONTINUED | OUTPATIENT
Start: 2022-09-26 | End: 2022-09-26 | Stop reason: HOSPADM

## 2022-09-26 RX ORDER — FENTANYL CITRATE 50 UG/ML
25 INJECTION, SOLUTION INTRAMUSCULAR; INTRAVENOUS
Status: DISCONTINUED | OUTPATIENT
Start: 2022-09-26 | End: 2022-09-26 | Stop reason: HOSPADM

## 2022-09-26 RX ORDER — MEPERIDINE HYDROCHLORIDE 25 MG/ML
12.5 INJECTION INTRAMUSCULAR; INTRAVENOUS; SUBCUTANEOUS
Status: DISCONTINUED | OUTPATIENT
Start: 2022-09-26 | End: 2022-09-26 | Stop reason: HOSPADM

## 2022-09-26 RX ORDER — CEFAZOLIN SODIUM/WATER 2 G/20 ML
2 SYRINGE (ML) INTRAVENOUS SEE ADMIN INSTRUCTIONS
Status: DISCONTINUED | OUTPATIENT
Start: 2022-09-26 | End: 2022-09-26 | Stop reason: HOSPADM

## 2022-09-26 RX ORDER — DEXAMETHASONE SODIUM PHOSPHATE 4 MG/ML
INJECTION, SOLUTION INTRA-ARTICULAR; INTRALESIONAL; INTRAMUSCULAR; INTRAVENOUS; SOFT TISSUE PRN
Status: DISCONTINUED | OUTPATIENT
Start: 2022-09-26 | End: 2022-09-26

## 2022-09-26 RX ORDER — ACETAMINOPHEN 325 MG/1
975 TABLET ORAL ONCE
Status: DISCONTINUED | OUTPATIENT
Start: 2022-09-26 | End: 2022-09-26 | Stop reason: HOSPADM

## 2022-09-26 RX ORDER — HYDROMORPHONE HCL IN WATER/PF 6 MG/30 ML
0.2 PATIENT CONTROLLED ANALGESIA SYRINGE INTRAVENOUS EVERY 5 MIN PRN
Status: DISCONTINUED | OUTPATIENT
Start: 2022-09-26 | End: 2022-09-26 | Stop reason: HOSPADM

## 2022-09-26 RX ADMIN — ACETAMINOPHEN 975 MG: 325 TABLET, FILM COATED ORAL at 09:19

## 2022-09-26 RX ADMIN — DEXAMETHASONE SODIUM PHOSPHATE 8 MG: 4 INJECTION, SOLUTION INTRA-ARTICULAR; INTRALESIONAL; INTRAMUSCULAR; INTRAVENOUS; SOFT TISSUE at 10:58

## 2022-09-26 RX ADMIN — GABAPENTIN 300 MG: 300 CAPSULE ORAL at 09:19

## 2022-09-26 RX ADMIN — FENTANYL CITRATE 50 MCG: 50 INJECTION INTRAVENOUS at 11:04

## 2022-09-26 RX ADMIN — SODIUM CHLORIDE, POTASSIUM CHLORIDE, SODIUM LACTATE AND CALCIUM CHLORIDE: 600; 310; 30; 20 INJECTION, SOLUTION INTRAVENOUS at 09:31

## 2022-09-26 RX ADMIN — MIDAZOLAM 2 MG: 1 INJECTION INTRAMUSCULAR; INTRAVENOUS at 10:38

## 2022-09-26 RX ADMIN — KETAMINE HYDROCHLORIDE 20 MG: 10 INJECTION, SOLUTION INTRAMUSCULAR; INTRAVENOUS at 10:44

## 2022-09-26 RX ADMIN — LIDOCAINE HYDROCHLORIDE 0.1 ML: 10 INJECTION, SOLUTION EPIDURAL; INFILTRATION; INTRACAUDAL; PERINEURAL at 09:32

## 2022-09-26 RX ADMIN — FENTANYL CITRATE 50 MCG: 50 INJECTION INTRAVENOUS at 10:44

## 2022-09-26 RX ADMIN — KETAMINE HYDROCHLORIDE 10 MG: 10 INJECTION, SOLUTION INTRAMUSCULAR; INTRAVENOUS at 11:01

## 2022-09-26 RX ADMIN — PROPOFOL 150 MCG/KG/MIN: 10 INJECTION, EMULSION INTRAVENOUS at 10:42

## 2022-09-26 RX ADMIN — Medication 2 G: at 10:39

## 2022-09-26 ASSESSMENT — ACTIVITIES OF DAILY LIVING (ADL)
ADLS_ACUITY_SCORE: 35
ADLS_ACUITY_SCORE: 35

## 2022-09-26 NOTE — ANESTHESIA POSTPROCEDURE EVALUATION
Patient: Shante Carrington    Procedure: Procedure(s):  HYSTEROSCOPY, DIAGNOSTIC, WITH fractional DILATION AND CURETTAGE OF UTERUS       Anesthesia Type:  General    Note:  Disposition: Outpatient   Postop Pain Control: Uneventful            Sign Out: Well controlled pain   PONV: No   Neuro/Psych: Uneventful            Sign Out: Acceptable/Baseline neuro status   Airway/Respiratory: Uneventful            Sign Out: Acceptable/Baseline resp. status   CV/Hemodynamics: Uneventful            Sign Out: Acceptable CV status; No obvious hypovolemia; No obvious fluid overload   Other NRE: NONE   DID A NON-ROUTINE EVENT OCCUR? No           Last vitals:  Vitals:    09/26/22 0855   BP: 126/64   Pulse: 67   Temp: 36.7  C (98.1  F)   SpO2: 100%       Electronically Signed By: BALA Segovia CRNA  September 26, 2022  11:21 AM  
03-Jan-2018 14:18

## 2022-09-26 NOTE — ANESTHESIA CARE TRANSFER NOTE
Patient: Shante Carrington    Procedure: Procedure(s):  HYSTEROSCOPY, DIAGNOSTIC, WITH fractional DILATION AND CURETTAGE OF UTERUS       Diagnosis: Postmenopausal bleeding [N95.0]  Diagnosis Additional Information: No value filed.    Anesthesia Type:   General     Note:    Oropharynx: oropharynx clear of all foreign objects  Level of Consciousness: awake      Independent Airway: airway patency satisfactory and stable  Dentition: dentition unchanged  Vital Signs Stable: post-procedure vital signs reviewed and stable  Report to RN Given: handoff report given  Patient transferred to: Phase II    Handoff Report: Identifed the Patient, Identified the Reponsible Provider, Reviewed the pertinent medical history, Discussed the surgical course, Reviewed Intra-OP anesthesia mangement and issues during anesthesia, Set expectations for post-procedure period and Allowed opportunity for questions and acknowledgement of understanding      Vitals:  Vitals Value Taken Time   BP     Temp     Pulse     Resp     SpO2 100 % 09/26/22 1120   Vitals shown include unvalidated device data.    Electronically Signed By: BALA Segovia CRNA  September 26, 2022  11:21 AM

## 2022-09-26 NOTE — OP NOTE
[facility] Gynecology  Operative Note    Pre-operative diagnosis: Postmenopausal bleeding [N95.0]   Post-operative diagnosis: Same   Procedure: Dilation and curettage- Fractional  Hysteroscopy  Endometrial polypectomy   Surgeon: Rio Fernando MD   Assistant(s): None   Anesthesia: MAC (monitored anesthesia care) and Paracervical block:  .5% Buvicaine   Estimated blood loss: 5 ml   Total IV fluids:    Saline  Uterine distension  Deficit (See anesthesia record)  800ml    150 ml   Blood transfusion: No transfusion was given during surgery   Total urine output: (See anesthesia record)  Not measured   Drains: None   Specimens: ECC  Endometrial curetting  Endometrial polyp   Findings: Uterus sounded to 6.5 cm  Endometrial polyp   Complications: None   Condition: Stable   Comments:She was prepped and draped.  A timeout was held confirming her identity and proposed procedures. All were in agreement.  Shante Carrington   1955  [id number]      Shante Carrington  presented for the above procedure.  She has vaginal; , is s/p menopause  I met with Shante  and her , Jerry and discussed the planned procedure as well as the expected post operative course.  Risks of complications were noted and postoperative signs to watch for outlined.  Questions were answered and consent signed.  She was taken to the OR [facility] where she was placed in the supine position. She underwent MAC anesthesia.  She was then placed in the Dorso-lithotomy position in Heriberto stirru.  An examination under anesthesia was performed that showed:well supported uterus.    She was prepped and draped.  A timeout was held confirming her identity and proposed procedures. All were in agreement.    A speculum was placed in the vagina and the cervix was isolated.  Paracervical block was placed with 0.5% bupivacaine and a tenaculum was applied at 12:00.  An ECC was performed and sent separately.  Uterus then sounded to 8 cm and easily  serially dilated to #8 Hegar dilator.  Hysteroscopy was then performed.  Both tubal ostia were visualized.  There was a polyp present attached just distal to the right tubal ostium.  This was grasped and removed separately.  She had then underwent sharp curettage with a small amount of tissue obtained.  At this point the procedure was complete.  All vaginal instruments were removed.  Sponge and needle counts were correct.  Patient was awakened taken the PACU in good condition.  She tolerated the procedure well    Rio Fernando MD

## 2022-09-26 NOTE — DISCHARGE INSTRUCTIONS
Same Day Surgery Discharge Instructions  Special Precautions After Surgery - Adult    It is not unusual to feel lightheaded or faint, up to 24 hours after surgery or while taking pain medication.  If you have these symptoms; sit for a few minutes before standing and have someone assist you when getting up.  You should rest and relax for the next 24 hours and must have someone stay with you for at least 24 hours after your discharge.  DO NOT DRIVE any vehicle or operate mechanical equipment for 24 hours following the end of your surgery.  DO NOT DRIVE while taking narcotic pain medications that have been prescribed by your physician.  If you had a limb operated on, you must be able to use it fully to drive.  DO NOT drink alcoholic beverages for 24 hours following surgery or while taking prescription pain medication.  Drink clear liquids (apple juice, ginger ale, broth, 7-Up, etc.).  Progress to your regular diet as you feel able.  Any questions call your physician and do not make important decisions for 24 hours.    ACTIVITY  Rest today.  No diet restrictions.  Pelvic rest for 1 week  May shower after 24 hours     INCISIONAL CARE  Be alert for signs of infection:  redness, swelling, heat, drainage of pus, and/or elevated temperature.  Contact your doctor if these occur.        Call for an appointment to return to the clinic in 6 weeks    Medications:  Acetaminophen (Tylenol):  Next dose: 3pm.  Ibuprofen (Motrin, Advil):  Next dose: Anytime.  Follow the instructions on the bottle.     Additional discharge instructions: None  __________________________________________________________________________________________________________________________________  IMPORTANT NUMBERS:    [facility] Main Number:  [phone], [phone]  Pharmacy:  [phone]  Same Day Surgery:  [phone], Monday - Friday until 8:30 p.m.   Clinic:  [phone]   Sur[facility] Specialty Clinic:  [phone]

## 2022-09-27 LAB
PATH REPORT.COMMENTS IMP SPEC: NORMAL
PATH REPORT.COMMENTS IMP SPEC: NORMAL
PATH REPORT.FINAL DX SPEC: NORMAL
PATH REPORT.GROSS SPEC: NORMAL
PATH REPORT.MICROSCOPIC SPEC OTHER STN: NORMAL
PATH REPORT.RELEVANT HX SPEC: NORMAL
PHOTO IMAGE: NORMAL

## 2022-09-27 NOTE — OR NURSING
"Patient comment: everyone did a great job.\"I especially appreciated Tevin from anesthesia who took great care of me and made me very comfortable\".  "

## 2022-11-08 ENCOUNTER — OFFICE VISIT (OUTPATIENT)
Dept: OBGYN | Facility: CLINIC | Age: 67
End: 2022-11-08
Payer: COMMERCIAL

## 2022-11-08 VITALS
HEIGHT: 60 IN | RESPIRATION RATE: 16 BRPM | HEART RATE: 75 BPM | DIASTOLIC BLOOD PRESSURE: 71 MMHG | SYSTOLIC BLOOD PRESSURE: 123 MMHG | TEMPERATURE: 97.9 F | BODY MASS INDEX: 26.52 KG/M2 | WEIGHT: 135.1 LBS

## 2022-11-08 DIAGNOSIS — N93.9 ABNORMAL UTERINE BLEEDING DUE TO ENDOMETRIAL POLYP: ICD-10-CM

## 2022-11-08 DIAGNOSIS — N84.0 ABNORMAL UTERINE BLEEDING DUE TO ENDOMETRIAL POLYP: ICD-10-CM

## 2022-11-08 DIAGNOSIS — N95.0 POST-MENOPAUSAL BLEEDING: Primary | ICD-10-CM

## 2022-11-08 PROCEDURE — 99212 OFFICE O/P EST SF 10 MIN: CPT | Performed by: OBSTETRICS & GYNECOLOGY

## 2022-11-08 NOTE — PROGRESS NOTES
"TITO Carrington is a 66 year old female presents for post operative check. She is  6  week(s) status post hysteroscopy with Polyp removal.  She reports doing well and denies significant pain or bleeding. Pathological findings significant for benign endometrial polyp    O.  Blood pressure 123/71, pulse 75, temperature 97.9  F (36.6  C), resp. rate 16, height 1.511 m (4' 11.5\"), weight 61.3 kg (135 lb 1.6 oz).    Abd: soft, non-tender, non-distended. Incision clear, dry, and intact without evidence of infection.    A. /P.  (N95.0) Post-menopausal bleeding  (primary encounter diagnosis)  (N93.9,  N84.0) Abnormal uterine bleeding due to endometrial polyp  Comment: benign process  Plan: return to normal activity         Follow up prn or when due for next annual exam.    Rio Fernando MD    "

## 2022-11-25 ENCOUNTER — TRANSFERRED RECORDS (OUTPATIENT)
Dept: HEALTH INFORMATION MANAGEMENT | Facility: CLINIC | Age: 67
End: 2022-11-25

## 2022-12-09 ENCOUNTER — TRANSCRIBE ORDERS (OUTPATIENT)
Dept: OTHER | Age: 67
End: 2022-12-09

## 2022-12-09 DIAGNOSIS — M25.562 CHRONIC PAIN OF LEFT KNEE: Primary | ICD-10-CM

## 2022-12-09 DIAGNOSIS — G89.29 CHRONIC PAIN OF LEFT KNEE: Primary | ICD-10-CM

## 2022-12-15 ENCOUNTER — HOSPITAL ENCOUNTER (OUTPATIENT)
Dept: PHYSICAL THERAPY | Facility: CLINIC | Age: 67
Setting detail: THERAPIES SERIES
Discharge: HOME | End: 2022-12-15
Attending: FAMILY MEDICINE
Payer: COMMERCIAL

## 2022-12-15 PROCEDURE — 97110 THERAPEUTIC EXERCISES: CPT | Mod: GP | Performed by: PHYSICAL THERAPIST

## 2022-12-15 PROCEDURE — 97161 PT EVAL LOW COMPLEX 20 MIN: CPT | Mod: GP | Performed by: PHYSICAL THERAPIST

## 2022-12-16 NOTE — PROGRESS NOTES
12/15/22 1100   General Information   Type of Visit Initial OP Ortho PT Evaluation   Start of Care Date 12/15/22   Referring Physician Sulaiman   Patient/Family Goals Statement build up L knee   Orders Evaluate and Treat   Date of Order 12/01/22   Certification Required? No   Medical Diagnosis AKP   Surgical/Medical history reviewed Yes   Precautions/Limitations no known precautions/limitations   Body Part(s)   Body Part(s) Knee   Presentation and Etiology   Pertinent history of current problem (include personal factors and/or comorbidities that impact the POC) hx of AKP for several months.  Had MRI that shows FT chondral loss med and lat.  retired. wants to be active.  has Hip and lBP as well. Hx of L34 disc.  no change with synvisc. OA, RA depression   Impairments A. Pain;D. Decreased ROM;E. Decreased flexibility;H. Impaired gait;J. Burning;B. Decreased WB tolerance   Functional Limitations perform activities of daily living;perform desired leisure / sports activities   Symptom Location jaya knee   How/Where did it occur From insidious onset;From Degenerative Joint Disease;With repetition/overuse   Onset date of current episode/exacerbation 10/05/22   Chronicity New   Pain rating (0-10 point scale) Best (/10);Worst (/10)   Best (/10) 0   Pain quality A. Sharp;C. Aching;B. Dull   Frequency of pain/symptoms A. Constant   Pain/symptoms are: The same all the time   Pain/symptoms exacerbated by I. Bending;J. ADL;K. Home tasks;D. Carrying;C. Lifting   Pain/symptoms eased by H. Cold;C. Rest;I. OTC medication(s)   Progression of symptoms since onset: Improved  (since injection)   Fall Risk Screen   Have you fallen 2 or more times in the past year? No   Have you fallen and had an injury in the past year? No   Is patient a fall risk? No   Abuse Screen (yes response referral indicated)   Feels Unsafe at Home or Work/School no   Feels Threatened by Someone no   Does Anyone Try to Keep You From Having Contact with Others or  "Doing Things Outside Your Home? no   Knee Objective Findings   Gait/Locomotion L heel whip   Step Test Height 6\" genu valgus and pain   Palpation tender- medial patellar facet, med and lat jt line, ITB, greater troch, L ASIS, PSIS, piriformis.   Accessory Motion/Joint Mobility L post innom, + sup to sit, good med/lat patellar mobility, lateral patellar tilt   Side (if bilateral, select both right and left) Left   Left Knee Extension AROM 0   Left Knee Flexion AROM 135   Left Knee Flexion Strength 4   Left Knee Extension Strength 4   Left Hip Abduction Strength 4-   Left Quad Set Strength fair   L VMO Strength fair   Left Gastrocnemius Flexibility 12   Left Hamstring Flexibility 55   Left Hip Flexor Flexibility 10   Left Quadricep Flexibility 110   Planned Therapy Interventions   Planned Therapy Interventions ROM;strengthening;stretching;joint mobilization;manual therapy;balance training   Clinical Impression   Criteria for Skilled Therapeutic Interventions Met yes, treatment indicated   PT Diagnosis L knee pain   Influenced by the following impairments pain, weakess, OA   Functional limitations due to impairments standing, squatting, stairs   Clinical Presentation Stable/Uncomplicated   Clinical Presentation Rationale OA, RA depression   Clinical Decision Making (Complexity) Low complexity   Therapy Frequency 1 time/week   Predicted Duration of Therapy Intervention (days/wks) 8wk   Risk & Benefits of therapy have been explained Yes   Patient, Family & other staff in agreement with plan of care Yes   Education Assessment   Preferred Learning Style Demonstration   Barriers to Learning No barriers   ORTHO GOALS   PT Ortho Eval Goals 1;2;3   Ortho Goal 1   Goal Identifier pt will be able to sit to stand without pain   Target Date 01/13/23   Ortho Goal 2   Goal Identifier 2   Goal Description pt will be able to ascend stairs reciprocally with 1 rail   Target Date 01/27/23   Ortho Goal 3   Goal Identifier 3   Goal " Description pt will be able to walk 1 hour for community ambulation   Target Date 02/10/23   Total Evaluation Time   PT Yesika, Low Complexity Minutes (37822) 04

## 2022-12-28 ENCOUNTER — HOSPITAL ENCOUNTER (OUTPATIENT)
Dept: PHYSICAL THERAPY | Facility: CLINIC | Age: 67
Setting detail: THERAPIES SERIES
Discharge: HOME | End: 2022-12-28
Attending: FAMILY MEDICINE
Payer: COMMERCIAL

## 2022-12-28 PROCEDURE — 97110 THERAPEUTIC EXERCISES: CPT | Mod: GP | Performed by: PHYSICAL THERAPIST

## 2022-12-28 PROCEDURE — 97140 MANUAL THERAPY 1/> REGIONS: CPT | Mod: GP | Performed by: PHYSICAL THERAPIST

## 2023-01-04 ENCOUNTER — HOSPITAL ENCOUNTER (OUTPATIENT)
Dept: PHYSICAL THERAPY | Facility: CLINIC | Age: 68
Setting detail: THERAPIES SERIES
Discharge: HOME | End: 2023-01-04
Attending: FAMILY MEDICINE
Payer: COMMERCIAL

## 2023-01-04 PROCEDURE — 97110 THERAPEUTIC EXERCISES: CPT | Mod: GP | Performed by: PHYSICAL THERAPIST

## 2023-01-04 PROCEDURE — 97140 MANUAL THERAPY 1/> REGIONS: CPT | Mod: GP | Performed by: PHYSICAL THERAPIST

## 2023-01-09 ENCOUNTER — HOSPITAL ENCOUNTER (OUTPATIENT)
Dept: PHYSICAL THERAPY | Facility: CLINIC | Age: 68
Setting detail: THERAPIES SERIES
Discharge: HOME | End: 2023-01-09
Attending: FAMILY MEDICINE
Payer: COMMERCIAL

## 2023-01-09 PROCEDURE — 97140 MANUAL THERAPY 1/> REGIONS: CPT | Mod: GP | Performed by: PHYSICAL THERAPIST

## 2023-03-21 ENCOUNTER — OFFICE VISIT (OUTPATIENT)
Dept: FAMILY MEDICINE | Facility: CLINIC | Age: 68
End: 2023-03-21
Payer: MEDICARE

## 2023-03-21 VITALS
TEMPERATURE: 97.9 F | SYSTOLIC BLOOD PRESSURE: 116 MMHG | RESPIRATION RATE: 16 BRPM | WEIGHT: 148 LBS | BODY MASS INDEX: 29.06 KG/M2 | HEART RATE: 79 BPM | DIASTOLIC BLOOD PRESSURE: 73 MMHG | HEIGHT: 60 IN | OXYGEN SATURATION: 95 %

## 2023-03-21 DIAGNOSIS — H81.10 BENIGN PAROXYSMAL POSITIONAL VERTIGO, UNSPECIFIED LATERALITY: Primary | ICD-10-CM

## 2023-03-21 PROCEDURE — 99203 OFFICE O/P NEW LOW 30 MIN: CPT | Performed by: FAMILY MEDICINE

## 2023-03-21 RX ORDER — MECLIZINE HYDROCHLORIDE 25 MG/1
25 TABLET ORAL 3 TIMES DAILY PRN
Qty: 90 TABLET | Refills: 0 | Status: SHIPPED | OUTPATIENT
Start: 2023-03-21

## 2023-03-21 ASSESSMENT — PAIN SCALES - GENERAL: PAINLEVEL: NO PAIN (0)

## 2023-03-21 NOTE — PROGRESS NOTES
"  Assessment & Plan     (H81.10) Benign paroxysmal positional vertigo, unspecified laterality  (primary encounter diagnosis)  Comment: Recommend using meclizine or Benadryl as needed at onset of symptoms. Referral to vestibular physical therapy. Return in 3 months if symptoms do not improve with PT or worsen, and an MRI of the head will be ordered.   Plan: meclizine (ANTIVERT) 25 MG tablet, Physical         Therapy Referral           BMI:   Estimated body mass index is 29.39 kg/m  as calculated from the following:    Height as of this encounter: 1.511 m (4' 11.5\").    Weight as of this encounter: 67.1 kg (148 lb).   Weight management plan: Discussed healthy diet and exercise guidelines      Return in about 3 months (around 6/21/2023).    Susan Hess RN, FNP student  The student acted as a scribe and the encounter documented above was completely performed by myself and the documentation reflects the work I have performed today.    Vadim Muniz MD  [facility]   Melanie is a 67 year old, presenting for the following health issues:  Dizziness (3 months )      History of Present Illness       Reason for visit:  Vertigo  Symptom onset:  More than a month  Symptoms include:  Dizziness with sitting up from bed  Symptom intensity:  Severe  Symptom progression:  Staying the same  Had these symptoms before:  Yes  Has tried/received treatment for these symptoms:  Yes  Previous treatment was successful:  No  What makes it worse:  From lying down to sitting up  What makes it better:  No    She eats 4 or more servings of fruits and vegetables daily.She consumes 0 sweetened beverage(s) daily.She exercises with enough effort to increase her heart rate 10 to 19 minutes per day.  She exercises with enough effort to increase her heart rate 3 or less days per week.   She is taking medications regularly.     Presents for concerns of dizziness since December 2022. Dizziness symptoms \"feel like the room " "is spinning\" and she is going to \"pass out.\" Was seen at another clinic on 01/12/2023 for the same issue. At that visit, Ashburnham-Hallpike maneuver was positive and Epley maneuvers were performed but patient states that it was not helpful. She was also recommended to use some Meclizine, Dimenhydrinate as needed if symptoms worsen. Patient states that she does not remember this but adds \"if I was prescribed anything, I most likely took it all\"     Review of Systems   Constitutional, HEENT, cardiovascular, pulmonary, gi and gu systems are negative, except as otherwise noted.      Objective    /73   Pulse 79   Temp 97.9  F (36.6  C) (Tympanic)   Resp 16   Ht 1.511 m (4' 11.5\")   Wt 67.1 kg (148 lb)   SpO2 95%   BMI 29.39 kg/m    Body mass index is 29.39 kg/m .     Physical Exam   GENERAL: healthy, alert and no distress  EYES: Eyes grossly normal to inspection, PERRL and conjunctivae and sclerae normal  HENT: ear canals and TM's normal, nose and mouth without ulcers or lesions  NECK: no adenopathy, no asymmetry, masses, or scars and thyroid normal to palpation  RESP: lungs clear to auscultation - no rales, rhonchi or wheezes  CV: regular rate and rhythm, normal S1 S2, no S3 or S4, no murmur, click or rub, no peripheral edema and peripheral pulses strong  ABDOMEN: soft, nontender, no hepatosplenomegaly, no masses and bowel sounds normal  NEURO: Normal strength and tone, mentation intact and speech normal. Ashburnham-Hallpike maneuver negative.       "

## 2023-03-22 ENCOUNTER — TELEPHONE (OUTPATIENT)
Dept: FAMILY MEDICINE | Facility: CLINIC | Age: 68
End: 2023-03-22
Payer: MEDICARE

## 2023-03-22 ENCOUNTER — TRANSCRIBE ORDERS (OUTPATIENT)
Dept: OTHER | Age: 68
End: 2023-03-22

## 2023-03-22 DIAGNOSIS — H81.10 BENIGN PAROXYSMAL POSITIONAL VERTIGO, UNSPECIFIED LATERALITY: ICD-10-CM

## 2023-03-22 RX ORDER — MECLIZINE HYDROCHLORIDE 25 MG/1
25 TABLET ORAL 3 TIMES DAILY PRN
Qty: 90 TABLET | Refills: 0 | Status: CANCELLED | OUTPATIENT
Start: 2023-03-22

## 2023-03-22 NOTE — TELEPHONE ENCOUNTER
I confirmed with the pharmacy that they did receive the meclizine (ANTIVERT) 25 MG tablet, but there was a clerical error on their end and it was entered into another patient's chart.       Nursing support: Patricia (pharmacy staff) reports that they will fix this and reach out to the patient when they can pick this up. Thank you. Shante Esquivel R.N.

## 2023-03-22 NOTE — TELEPHONE ENCOUNTER
New Medication Request        What medication are you requesting?:   Patient was seen by Dr. Muniz on 3/21/23.  Dr. Muniz prescribed Meclizine 25 mg tablet.  When TC called [facility] they said they did not receive the Rx.    Reason for medication request:   Patient requesting.     Have you taken this medication before?: No    Controlled Substance Agreement on file:   CSA -- Patient Level:    CSA: None found at the patient level.         Patient offered an appointment? Yes, seen 3/21/23.    Preferred Pharmacy:     [id number] - [address] [facility] [address]  [address] [facility] [address]  Phone: [phone] Fax: [phone]      [facility] we [facility] you [facility] or would you prefer to receive a phone call?:   Patient would prefer a phone call   Okay to leave a detailed message?: Yes at Cell number on file:    Telephone Information:   Mobile [phone]

## 2023-03-24 ENCOUNTER — TRANSCRIBE ORDERS (OUTPATIENT)
Dept: OTHER | Age: 68
End: 2023-03-24

## 2023-03-24 DIAGNOSIS — Z96.652 S/P TOTAL KNEE ARTHROPLASTY, LEFT: Primary | ICD-10-CM

## 2023-04-04 ENCOUNTER — HOSPITAL ENCOUNTER (OUTPATIENT)
Dept: PHYSICAL THERAPY | Facility: CLINIC | Age: 68
Setting detail: THERAPIES SERIES
Discharge: HOME | End: 2023-04-04
Attending: FAMILY MEDICINE
Payer: MEDICARE

## 2023-04-04 DIAGNOSIS — H81.10 BENIGN PAROXYSMAL POSITIONAL VERTIGO, UNSPECIFIED LATERALITY: ICD-10-CM

## 2023-04-04 PROCEDURE — 97161 PT EVAL LOW COMPLEX 20 MIN: CPT | Mod: GP | Performed by: PHYSICAL THERAPIST

## 2023-04-06 ENCOUNTER — HOSPITAL ENCOUNTER (OUTPATIENT)
Dept: PHYSICAL THERAPY | Facility: CLINIC | Age: 68
Setting detail: THERAPIES SERIES
Discharge: HOME | End: 2023-04-06
Attending: FAMILY MEDICINE
Payer: MEDICARE

## 2023-04-06 PROCEDURE — 97110 THERAPEUTIC EXERCISES: CPT | Mod: GP | Performed by: PHYSICAL THERAPIST

## 2023-04-07 NOTE — PROGRESS NOTES
[facility]    OUTPATIENT PHYSICAL THERAPY ORTHOPEDIC EVALUATION  PLAN OF TREATMENT FOR OUTPATIENT REHABILITATION  (COMPLETE FOR INITIAL CLAIMS ONLY)  Patient's Last Name, First Name, M.I.  YOB: 1955  Shante Carrington    Provider s Name:  [facility]   Medical Record No.  [id number]   Start of Care Date:      Onset Date:   10/5/22   Type:     _X__PT   ___OT   ___SLP Medical Diagnosis:   Knee pain     PT Diagnosis:     Knee pain Visits from SOC:  1      _________________________________________________________________________________  Plan of Treatment/Functional Goals: Therapeutic Ex, NMR, gait training, Manual Therapy              Goals  Goal Identifier: pt will be able to sit to stand without pain     Target Date: 05/13/23    Goal Identifier: 2  Goal Description: pt will be able to ascend stairs reciprocally with 1 rail  Target Date: 4/27/23    Goal Identifier: 3  Goal Description: pt will be able to walk 1 hour for community ambulation  Target Date: 05/10/23                   Therapy Frequency:   1x/wk x 4 week  Predicted Duration of Therapy Intervention:   4 week    Dominic Murray, PT                 I CERTIFY THE NEED FOR THESE SERVICES FURNISHED UNDER        THIS PLAN OF TREATMENT AND WHILE UNDER MY CARE .             Physician Signature               Date    X_____________________________________________________                             Certification Date From:    4/6/23  Certification Date To:   5/4/23    Referring Provider:   Dr Solo    Initial Assessment        See Epic Evaluation

## 2023-04-07 NOTE — PROGRESS NOTES
[facility]    Outpatient Physical Therapy Progress Note  Patient: Shante Carrington  : 1955    Beginning/End Dates of Reporting Period:  12/15/22 to 3/10/23    Referring Provider: [place]    Therapy Diagnosis: Knee pain     Client Self Report: still having a very difficult time with her knee.  it is ok bending and sitting but painful when walking and it will catch if she twists.    Objective Measurements:  Objective Measure: mod limp, antalgic         Goals:  Goal Identifier pt will be able to sit to stand without pain   Goal Description     Target Date 23   Date Met      Progress (detail required for progress note):  Some improvement, still painful     Goal Identifier 2   Goal Description pt will be able to ascend stairs reciprocally with 1 rail   Target Date 23   Date Met      Progress (detail required for progress note):  Improved control but still painful     Goal Identifier 3   Goal Description pt will be able to walk 1 hour for community ambulation   Target Date 02/10/23   Date Met      Progress (detail required for progress note):  Knee still gets painful with walking     Goal Identifier     Goal Description     Target Date     Date Met      Progress (detail required for progress note):               Plan:  Continue therapy per current plan of care.  The patient plans to continue PT but went to [place] for a couple months.  She will continue her HEP and return to PT when she is back in [place].  She has made very mild gains in strength and flexibility but still has pain with WBing.      Discharge:  No

## 2023-05-08 ENCOUNTER — HOSPITAL ENCOUNTER (OUTPATIENT)
Dept: PHYSICAL THERAPY | Facility: CLINIC | Age: 68
Setting detail: THERAPIES SERIES
Discharge: HOME | End: 2023-05-08
Attending: ORTHOPAEDIC SURGERY
Payer: MEDICARE

## 2023-05-08 PROCEDURE — 97110 THERAPEUTIC EXERCISES: CPT | Mod: GP | Performed by: PHYSICAL THERAPIST

## 2023-05-08 PROCEDURE — 97116 GAIT TRAINING THERAPY: CPT | Mod: GP | Performed by: PHYSICAL THERAPIST

## 2023-05-08 PROCEDURE — 97161 PT EVAL LOW COMPLEX 20 MIN: CPT | Mod: GP | Performed by: PHYSICAL THERAPIST

## 2023-05-08 NOTE — PROGRESS NOTES
05/08/23 1000   General Information   Type of Visit Initial OP Ortho PT Evaluation   Start of Care Date 05/08/23   Referring Physician comfort   Patient/Family Goals Statement regain knees   Orders Evaluate and Treat   Date of Order 05/02/23   Certification Required? Yes   Medical Diagnosis L TK   Surgical/Medical history reviewed Yes   Precautions/Limitations no known precautions/limitations   Body Part(s)   Body Part(s) Knee   Presentation and Etiology   Pertinent history of current problem (include personal factors and/or comorbidities that impact the POC) hx of AKP for several months.  Had MRI that shows FT chondral loss med and lat.  retired. wants to be active.  has Hip and lBP as well. Hx of L34 disc.  no change with synvisc. OA, RA depression.  HAd TK last tuesday.  recovering.  pain is as expected.  pain 8/10 now.  using 2ww.  cane at home.  progressing.   Impairments A. Pain;B. Decreased WB tolerance;C. Swelling;E. Decreased flexibility;F. Decreased strength and endurance;G. Impaired balance;H. Impaired gait;K. Numbness;M. Locking or catching   Functional Limitations perform desired leisure / sports activities;perform activities of daily living   Symptom Location entire knee   How/Where did it occur From Degenerative Joint Disease   Onset date of current episode/exacerbation 10/05/22   Chronicity New   Pain rating (0-10 point scale) Best (/10);Worst (/10)   Best (/10) 4   Worst (/10) 8   Pain quality A. Sharp;C. Aching;F. Stabbing   Frequency of pain/symptoms A. Constant   Pain/symptoms are: The same all the time   Pain/symptoms exacerbated by C. Lifting;G. Certain positions;I. Bending;H. Overhead reach;J. ADL;K. Home tasks   Pain/symptoms eased by A. Sitting;B. Walking;C. Rest;E. Changing positions;F. Certain positions;H. Cold   Progression of symptoms since onset: Unchanged   Fall Risk Screen   Have you fallen 2 or more times in the past year? No   Have you fallen and had an injury in the past year?  No   Is patient a fall risk? No   Abuse Screen (yes response referral indicated)   Feels Unsafe at Home or Work/School no   Feels Threatened by Someone no   Does Anyone Try to Keep You From Having Contact with Others or Doing Things Outside Your Home? no   Physical Signs of Abuse Present no   Knee Objective Findings   Side (if bilateral, select both right and left) Left   Gait/Locomotion with 2ww, mod limp, stiff leg, abducted stance   Left Knee Extension AROM 8   Left Knee Flexion AROM 70   Left Knee Flexion Strength 4   Left Knee Extension Strength 10 deg lag but good SLR otherwise   Left Hip Abduction Strength 4   Left Quad Set Strength fair   Integumentary  incision covered by bandage.  mild bloody drainage.  +5 cm   Left Knee Flexion PROM 75   Knee Special Test Comments - dorsi test.   Planned Therapy Interventions   Planned Therapy Interventions ROM;strengthening;stretching   Clinical Impression   Criteria for Skilled Therapeutic Interventions Met yes, treatment indicated   PT Diagnosis knee OA, SP TK   Influenced by the following impairments pain, swelling, weakness, stiffness   Functional limitations due to impairments walking, standing, sleeping   Clinical Presentation Stable/Uncomplicated   Clinical Presentation Rationale knee OA   Clinical Decision Making (Complexity) Low complexity   Therapy Frequency 2 times/Week   Predicted Duration of Therapy Intervention (days/wks) 2 week then 1x/wk  x 4 week   Risk & Benefits of therapy have been explained Yes   Patient, Family & other staff in agreement with plan of care Yes   Education Assessment   Preferred Learning Style Demonstration   Barriers to Learning No barriers   ORTHO GOALS   PT Ortho Eval Goals 1;2;3   Ortho Goal 1   Goal Identifier 1   Goal Description pt will be able to sit to stand without pain   Target Date 06/05/23   Ortho Goal 2   Goal Identifier 2   Goal Description pt will be able to ascend stairs reciprocally with 1 rail   Target Date 06/19/23    Ortho Goal 3   Goal Identifier 3   Goal Description pt will amb without AD without limp   Target Date 05/29/23   Total Evaluation Time   PT Eval, Low Complexity Minutes (11687) 25   Therapy Certification   Certification date from 05/08/23   Certification date to 06/19/23   Medical Diagnosis BILLY MARQUEZ L

## 2023-05-08 NOTE — PROGRESS NOTES
[facility]    OUTPATIENT PHYSICAL THERAPY ORTHOPEDIC EVALUATION  PLAN OF TREATMENT FOR OUTPATIENT REHABILITATION  (COMPLETE FOR INITIAL CLAIMS ONLY)  Patient's Last Name, First Name, M.I.  YOB: 1955  Shante Carrington    Provider s Name:  [facility]   Medical Record No.  [id number]   Start of Care Date:  05/08/23   Onset Date:  10/05/22   Type:     _X__PT   ___OT   ___SLP Medical Diagnosis:  SP TK L     PT Diagnosis:  knee OA, SP TK   Visits from SOC:  1      _________________________________________________________________________________  Plan of Treatment/Functional Goals:  ROM, strengthening, stretching gait training           Goals  Goal Identifier: 1  Goal Description: pt will be able to sit to stand without pain  Target Date: 06/05/23    Goal Identifier: 2  Goal Description: pt will be able to ascend stairs reciprocally with 1 rail  Target Date: 06/19/23    Goal Identifier: 3  Goal Description: pt will amb without AD without limp  Target Date: 05/29/23     Therapy Frequency:  2 times/Week  Predicted Duration of Therapy Intervention:  2 week then 1x/wk  x 4 week    Dominic Murray, PT                 I CERTIFY THE NEED FOR THESE SERVICES FURNISHED UNDER        THIS PLAN OF TREATMENT AND WHILE UNDER MY CARE .             Physician Signature               Date    X_____________________________________________________                             Certification Date From:  05/08/23   Certification Date To:  06/19/23    Referring Provider:  Dr Prince    Initial Assessment        See Epic Evaluation Start of Care Date: 05/08/23

## 2023-05-10 ENCOUNTER — HOSPITAL ENCOUNTER (OUTPATIENT)
Dept: PHYSICAL THERAPY | Facility: CLINIC | Age: 68
Setting detail: THERAPIES SERIES
Discharge: HOME | End: 2023-05-10
Attending: ORTHOPAEDIC SURGERY
Payer: MEDICARE

## 2023-05-10 PROCEDURE — 97110 THERAPEUTIC EXERCISES: CPT | Mod: GP | Performed by: PHYSICAL THERAPIST

## 2023-05-10 NOTE — PROGRESS NOTES
[facility]    Outpatient Physical Therapy Discharge Note  Patient: Shante Carrington  : 1955    Beginning/End Dates of Reporting Period:  23 to 23    Referring Provider: [place]    Therapy Diagnosis: knee pain     Client Self Report: was in [place] for a couple months and has a TK scheduled.  Needs to review her ex.  Has a TK scheduled may 2. looking to get her HEP revised.  through day her pain level 5/10.  When walking long distance 10/10.  low back and hip have been more sore.    Objective Measurements:  Objective Measure: mod limp, antalgic  Details: hip abd 4-/5, quad set fair, ROM 3-130, quad prone 115              Goals:  Goal Identifier pt will be able to sit to stand without pain   Goal Description     Target Date 23   Date Met      Progress (detail required for progress note):       Goal Identifier 2   Goal Description pt will be able to ascend stairs reciprocally with 1 rail   Target Date 23   Date Met      Progress (detail required for progress note):       Goal Identifier 3   Goal Description pt will be able to walk 1 hour for community ambulation   Target Date 02/10/23   Date Met      Progress (detail required for progress note):             Plan:  Discharge from therapy.    Discharge:    Reason for Discharge: No further expectation of progress.  Pt planning to have TK    Discharge Plan: Patient to continue home program.

## 2023-05-16 ENCOUNTER — HOSPITAL ENCOUNTER (OUTPATIENT)
Dept: PHYSICAL THERAPY | Facility: CLINIC | Age: 68
Setting detail: THERAPIES SERIES
Discharge: HOME | End: 2023-05-16
Attending: ORTHOPAEDIC SURGERY
Payer: MEDICARE

## 2023-05-16 PROCEDURE — 97110 THERAPEUTIC EXERCISES: CPT | Mod: GP | Performed by: PHYSICAL THERAPIST

## 2023-05-16 NOTE — PROGRESS NOTES
PHYSICAL THERAPY INITIAL EVALUATION  04/04/23 1100   Quick Adds   Quick Adds Certification;Vestibular Eval   Type of Visit Initial OP PT Evaluation   General Information   Start of Care Date 04/04/23   Referring Physician Vadim Muniz MD   Orders Evaluate and Treat as Indicated   Order Date 03/21/23   Medical Diagnosis Benign paroxysmal positional vertigo, unspecified laterality (H81.10)   Onset of illness/injury or Date of Surgery 01/04/23   Precautions/Limitations no known precautions/limitations   Pertinent history of current problem (include personal factors and/or comorbidities that impact the POC) Sudden onset about 3 months ago. Room spinning symptoms with getting out of bed and turning head, especially to the right   Patient role/Employment history Retired   System Outcome Measures   Outcome Measures BPPV   Dizziness Handicap Inventory (score out of 100) A decrease in score by 17.18 or greater indicates a clinically significant change in symptoms. 12   Pain   Patient currently in pain No   Gait   Gait Comments normal   Gait Special Tests   Gait Special Tests DYNAMIC GAIT INDEX   Gait Special Tests Dynamic Gait Index   Comments 4 item DGI: 12/12   Balance Special Tests   Balance Special Tests Modified CTSIB Conditions   Balance Special Tests Modified CTSIB Conditions   Condition 1, seconds 30 Seconds   Condition 2, seconds 30 Seconds   Condition 4, seconds 30 Seconds   Condition 5, seconds 30 Seconds   Cervicogenic Screen   Neck ROM WFL, no symptoms   Vertebral Artery Test Normal   Alar Ligament Test Normal   Transverse Ligament Test Normal   Oculomotor Exam   Smooth Pursuit Normal   Saccades Normal   VOR Normal   VOR Cancellation Normal   Rapid Head Thrust Normal   Infrared Goggle Exam or Frenzel Lense Exam   Exam completed with Infrared Goggles   Spontaneous Nystagmus Negative   Gaze Evoked Nystagmus Negative   [place]-Hallpike (right) Negative   [place]-Hallpike (Left) Negative   [facility] Supine Roll Test  (Right) Negative   [facility] Supine Roll Test (Left) Negative   Planned Therapy Interventions   Planned Therapy Interventions neuromuscular re-education;other (see comments)   Planned Therapy Interventions Comment canalith repositioning if indicated   Clinical Impression   Criteria for Skilled Therapeutic Interventions Met evaluation only   PT Diagnosis likely resolved BPPV   Influenced by the following impairments dizziness   Functional limitations due to impairments turning head, bending   Clinical Presentation Stable/Uncomplicated   Clinical Presentation Rationale sx stable   Clinical Decision Making (Complexity) Low complexity   Therapy Frequency 1 time/week   Predicted Duration of Therapy Intervention (days/wks) 1 visit   Risk & Benefits of therapy have been explained Yes   Patient, Family & other staff in agreement with plan of care Yes   Clinical Impression Comments Likely resolved BPPV, return if symptoms return

## 2023-05-18 ENCOUNTER — THERAPY VISIT (OUTPATIENT)
Dept: PHYSICAL THERAPY | Facility: CLINIC | Age: 68
End: 2023-05-18
Attending: ORTHOPAEDIC SURGERY
Payer: MEDICARE

## 2023-05-18 DIAGNOSIS — Z96.652 S/P TOTAL KNEE ARTHROPLASTY, LEFT: Primary | ICD-10-CM

## 2023-05-18 PROCEDURE — 97110 THERAPEUTIC EXERCISES: CPT | Mod: GP | Performed by: PHYSICAL THERAPIST

## 2023-05-22 ENCOUNTER — THERAPY VISIT (OUTPATIENT)
Dept: PHYSICAL THERAPY | Facility: CLINIC | Age: 68
End: 2023-05-22
Attending: ORTHOPAEDIC SURGERY
Payer: MEDICARE

## 2023-05-22 DIAGNOSIS — Z96.652 STATUS POST TOTAL LEFT KNEE REPLACEMENT: ICD-10-CM

## 2023-05-22 PROCEDURE — 97116 GAIT TRAINING THERAPY: CPT | Mod: GP | Performed by: PHYSICAL THERAPIST

## 2023-05-22 PROCEDURE — 97110 THERAPEUTIC EXERCISES: CPT | Mod: GP | Performed by: PHYSICAL THERAPIST

## 2023-05-25 ENCOUNTER — THERAPY VISIT (OUTPATIENT)
Dept: PHYSICAL THERAPY | Facility: CLINIC | Age: 68
End: 2023-05-25
Attending: ORTHOPAEDIC SURGERY
Payer: MEDICARE

## 2023-05-25 DIAGNOSIS — Z96.652 STATUS POST TOTAL LEFT KNEE REPLACEMENT: Primary | ICD-10-CM

## 2023-05-25 PROCEDURE — 97110 THERAPEUTIC EXERCISES: CPT | Mod: GP | Performed by: PHYSICAL THERAPIST

## 2023-05-25 PROCEDURE — 97140 MANUAL THERAPY 1/> REGIONS: CPT | Mod: GP | Performed by: PHYSICAL THERAPIST

## 2023-06-01 ENCOUNTER — THERAPY VISIT (OUTPATIENT)
Dept: PHYSICAL THERAPY | Facility: CLINIC | Age: 68
End: 2023-06-01
Attending: ORTHOPAEDIC SURGERY
Payer: MEDICARE

## 2023-06-01 DIAGNOSIS — Z96.652 STATUS POST TOTAL LEFT KNEE REPLACEMENT: Primary | ICD-10-CM

## 2023-06-01 PROCEDURE — 97110 THERAPEUTIC EXERCISES: CPT | Mod: GP | Performed by: PHYSICAL THERAPIST

## 2023-06-01 PROCEDURE — 97140 MANUAL THERAPY 1/> REGIONS: CPT | Mod: GP | Performed by: PHYSICAL THERAPIST

## 2023-06-05 ENCOUNTER — THERAPY VISIT (OUTPATIENT)
Dept: PHYSICAL THERAPY | Facility: CLINIC | Age: 68
End: 2023-06-05
Attending: ORTHOPAEDIC SURGERY
Payer: MEDICARE

## 2023-06-05 DIAGNOSIS — Z96.652 STATUS POST TOTAL LEFT KNEE REPLACEMENT: Primary | ICD-10-CM

## 2023-06-05 PROCEDURE — 97140 MANUAL THERAPY 1/> REGIONS: CPT | Mod: GP | Performed by: PHYSICAL THERAPIST

## 2023-06-05 PROCEDURE — 97110 THERAPEUTIC EXERCISES: CPT | Mod: GP | Performed by: PHYSICAL THERAPIST

## 2023-06-13 ENCOUNTER — TRANSCRIBE ORDERS (OUTPATIENT)
Dept: OTHER | Age: 68
End: 2023-06-13

## 2023-06-23 ENCOUNTER — THERAPY VISIT (OUTPATIENT)
Dept: PHYSICAL THERAPY | Facility: CLINIC | Age: 68
End: 2023-06-23
Attending: ORTHOPAEDIC SURGERY
Payer: MEDICARE

## 2023-06-23 DIAGNOSIS — Z96.652 STATUS POST TOTAL LEFT KNEE REPLACEMENT: Primary | ICD-10-CM

## 2023-06-23 PROCEDURE — 97110 THERAPEUTIC EXERCISES: CPT | Mod: GP | Performed by: PHYSICAL THERAPIST

## 2023-06-23 PROCEDURE — 97140 MANUAL THERAPY 1/> REGIONS: CPT | Mod: GP | Performed by: PHYSICAL THERAPIST

## 2023-07-07 ENCOUNTER — THERAPY VISIT (OUTPATIENT)
Dept: PHYSICAL THERAPY | Facility: CLINIC | Age: 68
End: 2023-07-07
Attending: ORTHOPAEDIC SURGERY
Payer: MEDICARE

## 2023-07-07 DIAGNOSIS — Z96.652 STATUS POST TOTAL LEFT KNEE REPLACEMENT: Primary | ICD-10-CM

## 2023-07-07 PROCEDURE — 97110 THERAPEUTIC EXERCISES: CPT | Mod: GP | Performed by: PHYSICAL THERAPIST

## 2023-07-07 NOTE — PROGRESS NOTES
[facility]                                                                                   OUTPATIENT PHYSICAL THERAPY    PLAN OF TREATMENT FOR OUTPATIENT REHABILITATION   Patient's Last Name, First Name, Shante Paez YOB: 1955   Provider's Name   [facility]   Medical Record No.  [id number]     Onset Date: 10/05/22  Start of Care Date: 05/08/23     Medical Diagnosis:  S/P total knee arthroplasty, left      PT Treatment Diagnosis:  knee OA, SP TK Plan of Treatment  Frequency/Duration: 1x a week/ 8 weeks    Certification date from 06/19/23 to 08/21/23         See note for plan of treatment details and functional goals   Patient now able to walk without a cane but still walks with a mild limp, lacking full knee extension at heel strike. She is still quite tight into knee flexion, currently 105 degrees after stretching throughout PT session which limits her ability to go down stairs. Goal #2 has been met, 1 and 3 have been extended and a new stair goal was created today.      Nayely Valencia, PT                         I CERTIFY THE NEED FOR THESE SERVICES FURNISHED UNDER        THIS PLAN OF TREATMENT AND WHILE UNDER MY CARE .             Physician Signature               Date    X_____________________________________________________                    Referring Provider:  Dave Prince      Initial Assessment  See Epic Evaluation- Start of Care Date: 05/08/23 06/23/23 0500   Appointment Info   Signing clinician's name / credentials Nayely Valencia, PT, DPT, OCS   Total/Authorized Visits 365   Visits Used 9   Medical Diagnosis S/P total knee arthroplasty, left   PT Tx Diagnosis knee OA, SP TK   Progress Note/Certification   Start of Care Date 05/08/23   Onset of illness/injury or Date of Surgery 10/05/22   Therapy Frequency 1x a week   Predicted Duration 8 weeks   Certification date from 06/19/23   Certification  "date to 08/21/23   GOALS   PT Goals 4   PT Goal 1   Goal Identifier 1   Goal Description pt will be able to sit to stand without pain   Rationale to maximize safety and independence with performance of ADLs and functional tasks   Goal Progress able to perform, still some pain - goal extended   Target Date 08/21/23   PT Goal 2   Goal Identifier 2   Goal Description pt will be able to ascend stairs reciprocally with 1 rail   Rationale to maximize safety and independence with performance of ADLs and functional tasks   Target Date 06/19/23   Date Met 07/07/23   PT Goal 3   Goal Identifier 3   Goal Description pt will amb without AD without limp   Rationale to maximize safety and independence with performance of ADLs and functional tasks   Target Date 08/21/23   Goal Progress no AD, mild limp - goal extended   PT Goal 4   Goal Identifier New Goal   Goal Description Patient will be able to descend stairs reciprocally with 1 rail.   Goal Progress step to pattern currently   Target Date 08/21/23   Subjective Report   Subjective Report Walking without cane now. back is feeling better.   Objective Measure 1   Objective Measure L Knee AAROM   Details 0 - 2 - 105 after rx.   Objective Measure 2   Objective Measure gait   Details mild limp, much improved knee flexion through swing, still lacks full knee extension at heel strike.   Therapeutic Procedure/Exercise   Therapeutic Procedures: strength, endurance, ROM, flexibillity minutes (47813) 22   Ther Proc 1 - Details bike (seat 8-6)  full revolution 5 min.  Stair flexion and extension.  4\" then 5\" forward step downs, 1 hand rail.  seated passive KF with TF distraction. supine heel slides w/sheet assist.supine knee heel prop knee extension stretching.   Skilled Intervention ROM, strength   Manual Therapy   Manual Therapy: Mobilization, MFR, MLD, friction massage minutes (19778) 8   Manual Therapy 1 - Details PF sup/inf grade 3, scar mobs, Tibal AP grade 3 as tolerated   Skilled " Intervention MT to improve ROM and mobility   Patient Response/Progress pt reported it felt good   Total Session Time   Timed Code Treatment Minutes 30   Total Treatment Time (sum of timed and untimed services) 30         Please contact me with any questions or concerns.  Thank you for your referral.    Nayely Valencia, PT, DPT, OCS  Physical Therapist, Orthopedic Certified Specialist    [facility]  [address] [address]  [email or web address]   Office: [phone]   Employed by [facility]

## 2023-07-14 ENCOUNTER — THERAPY VISIT (OUTPATIENT)
Dept: PHYSICAL THERAPY | Facility: CLINIC | Age: 68
End: 2023-07-14
Attending: ORTHOPAEDIC SURGERY
Payer: MEDICARE

## 2023-07-14 DIAGNOSIS — Z96.652 STATUS POST TOTAL LEFT KNEE REPLACEMENT: Primary | ICD-10-CM

## 2023-07-14 PROCEDURE — 97140 MANUAL THERAPY 1/> REGIONS: CPT | Mod: GP | Performed by: PHYSICAL THERAPIST

## 2023-07-14 PROCEDURE — 97110 THERAPEUTIC EXERCISES: CPT | Mod: GP | Performed by: PHYSICAL THERAPIST

## 2023-07-25 ENCOUNTER — THERAPY VISIT (OUTPATIENT)
Dept: PHYSICAL THERAPY | Facility: CLINIC | Age: 68
End: 2023-07-25
Attending: ORTHOPAEDIC SURGERY
Payer: MEDICARE

## 2023-07-25 DIAGNOSIS — Z96.652 STATUS POST TOTAL LEFT KNEE REPLACEMENT: Primary | ICD-10-CM

## 2023-07-25 PROCEDURE — 97110 THERAPEUTIC EXERCISES: CPT | Mod: GP | Performed by: PHYSICAL THERAPIST

## 2023-08-02 ENCOUNTER — HOSPITAL ENCOUNTER (OUTPATIENT)
Dept: MRI IMAGING | Facility: CLINIC | Age: 68
Discharge: HOME | End: 2023-08-02
Attending: ORTHOPAEDIC SURGERY | Admitting: ORTHOPAEDIC SURGERY
Payer: MEDICARE

## 2023-08-02 DIAGNOSIS — M54.9 BACK PAIN: ICD-10-CM

## 2023-08-02 PROCEDURE — 72148 MRI LUMBAR SPINE W/O DYE: CPT

## 2023-08-03 ENCOUNTER — THERAPY VISIT (OUTPATIENT)
Dept: PHYSICAL THERAPY | Facility: CLINIC | Age: 68
End: 2023-08-03
Attending: ORTHOPAEDIC SURGERY
Payer: MEDICARE

## 2023-08-03 DIAGNOSIS — Z96.652 STATUS POST TOTAL LEFT KNEE REPLACEMENT: Primary | ICD-10-CM

## 2023-08-03 PROCEDURE — 97110 THERAPEUTIC EXERCISES: CPT | Mod: GP | Performed by: PHYSICAL THERAPIST

## 2023-08-08 ENCOUNTER — THERAPY VISIT (OUTPATIENT)
Dept: PHYSICAL THERAPY | Facility: CLINIC | Age: 68
End: 2023-08-08
Attending: ORTHOPAEDIC SURGERY
Payer: MEDICARE

## 2023-08-08 DIAGNOSIS — Z96.652 STATUS POST TOTAL LEFT KNEE REPLACEMENT: Primary | ICD-10-CM

## 2023-08-08 PROCEDURE — 97110 THERAPEUTIC EXERCISES: CPT | Mod: GP | Performed by: PHYSICAL THERAPIST

## 2023-08-15 ENCOUNTER — THERAPY VISIT (OUTPATIENT)
Dept: PHYSICAL THERAPY | Facility: CLINIC | Age: 68
End: 2023-08-15
Attending: ORTHOPAEDIC SURGERY
Payer: MEDICARE

## 2023-08-15 DIAGNOSIS — Z96.652 STATUS POST TOTAL LEFT KNEE REPLACEMENT: Primary | ICD-10-CM

## 2023-08-15 PROCEDURE — 97110 THERAPEUTIC EXERCISES: CPT | Mod: GP | Performed by: PHYSICAL THERAPIST

## 2023-08-15 NOTE — PROGRESS NOTES
[facility]                                                                                   OUTPATIENT PHYSICAL THERAPY    PLAN OF TREATMENT FOR OUTPATIENT REHABILITATION   Patient's Last Name, First Name, Shante Paez YOB: 1955   Provider's Name   [facility]   Medical Record No.  [id number]     Onset Date: 10/05/22  Start of Care Date: 05/08/23     Medical Diagnosis:  S/P total knee arthroplasty, left      PT Treatment Diagnosis:  knee OA, SP TK Plan of Treatment  Frequency/Duration: 1x a week/ 4-8 weeks    Certification date from 08/15/23 to 10/10/23         See note for plan of treatment details and functional goals   Melanie continues to make slow but steady progress. Her knee flexion ROM is now up to 117 with over pressure passively by the end of our sessions. She is no longer walking with a limp. She is still reporting difficulty descending stairs but can now do part of a flight with reciprocal pattern but still uses step-to pattern as well.     Nayely Valencia, PT                         I CERTIFY THE NEED FOR THESE SERVICES FURNISHED UNDER        THIS PLAN OF TREATMENT AND WHILE UNDER MY CARE .             Physician Signature               Date    X_____________________________________________________                    Referring Provider:  Dave Prince      Initial Assessment  See Epic Evaluation- Start of Care Date: 05/08/23           08/15/23 0500   Appointment Info   Signing clinician's name / credentials Nayely Valencia, PT, DPT, OCS   Total/Authorized Visits 365   Visits Used 15   Medical Diagnosis S/P total knee arthroplasty, left   PT Tx Diagnosis knee OA, SP TK   Progress Note/Certification   Start of Care Date 05/08/23   Onset of illness/injury or Date of Surgery 10/05/22   Therapy Frequency 1x a week   Predicted Duration 4-8 weeks   Certification date from 08/15/23   Certification date to 10/10/23  "  GOALS   PT Goals 4   PT Goal 1   Goal Identifier 1   Goal Description pt will be able to sit to stand without pain   Rationale to maximize safety and independence with performance of ADLs and functional tasks   Goal Progress no pain, able to do with ease   Target Date 08/21/23   Date Met 08/15/23   PT Goal 2   Goal Identifier 2   Goal Description pt will be able to ascend stairs reciprocally with 1 rail   Rationale to maximize safety and independence with performance of ADLs and functional tasks   Target Date 06/19/23   Date Met 07/07/23   PT Goal 3   Goal Identifier 3   Goal Description pt will amb without AD without limp   Rationale to maximize safety and independence with performance of ADLs and functional tasks   Goal Progress no AD, no limp, still lacks a few deg of knee extension at heel strike   Target Date 08/21/23   Date Met 08/15/23   PT Goal 4   Goal Identifier New Goal   Goal Description Patient will be able to descend stairs reciprocally with 1 rail.   Goal Progress improving, able to do part of a flight reciprocal and then moves to step-to   Target Date 08/21/23   Subjective Report   Subjective Report Knee is slowly getting better.   Objective Measure 1   Objective Measure L Knee AAROM   Details 0 - 0 - 117 after rx.   Objective Measure 2   Objective Measure gait   Therapeutic Procedure/Exercise   Therapeutic Procedures: strength, endurance, ROM, flexibillity minutes (80118) 25   Ther Proc 1 - Details bike L4 (seat  3-2)  full revolution 5 min.  Stair flexion (with therapist PA glide of tibia) and extension.  6\" forward step down 1 light hand support 3 x 10. sit to stands x 20.  supine heel slides w/sheet assist.supine knee heel prop knee extension stretch. manual knee extension stretching.   Skilled Intervention ROM, strength   Patient Response/Progress improving ease with step ex   Manual Therapy   Manual Therapy: Mobilization, MFR, MLD, friction massage minutes (65097) 5   Manual Therapy 1 - " Details PF sup/inf grade 3, Tibal AP grade 3 as tolerated.   Skilled Intervention MT to improve ROM and mobility   Patient Response/Progress pt reported it felt good   Plan   Home program PTRX - on phone   Plan for next session cont push ROM, stairs, prog strength ex   Total Session Time   Timed Code Treatment Minutes 30   Total Treatment Time (sum of timed and untimed services) 30         Please contact me with any questions or concerns.  Thank you for your referral.    Nayely Valencia, PT, DPT, OCS  Physical Therapist, Orthopedic Certified Specialist    [facility]  [address] [place] [id number]  [email or web address]   Office: [phone]   Employed by [employer]

## 2023-08-22 ENCOUNTER — THERAPY VISIT (OUTPATIENT)
Dept: PHYSICAL THERAPY | Facility: CLINIC | Age: 68
End: 2023-08-22
Attending: ORTHOPAEDIC SURGERY
Payer: MEDICARE

## 2023-08-22 DIAGNOSIS — Z96.652 STATUS POST TOTAL LEFT KNEE REPLACEMENT: Primary | ICD-10-CM

## 2023-08-22 PROCEDURE — 97110 THERAPEUTIC EXERCISES: CPT | Mod: GP | Performed by: PHYSICAL THERAPIST

## 2023-09-05 ENCOUNTER — THERAPY VISIT (OUTPATIENT)
Dept: PHYSICAL THERAPY | Facility: CLINIC | Age: 68
End: 2023-09-05
Attending: ORTHOPAEDIC SURGERY
Payer: MEDICARE

## 2023-09-05 DIAGNOSIS — Z96.652 STATUS POST TOTAL LEFT KNEE REPLACEMENT: Primary | ICD-10-CM

## 2023-09-05 PROCEDURE — 97110 THERAPEUTIC EXERCISES: CPT | Mod: GP | Performed by: PHYSICAL THERAPIST

## 2023-09-21 ENCOUNTER — THERAPY VISIT (OUTPATIENT)
Dept: PHYSICAL THERAPY | Facility: CLINIC | Age: 68
End: 2023-09-21
Attending: ORTHOPAEDIC SURGERY
Payer: MEDICARE

## 2023-09-21 DIAGNOSIS — Z96.652 STATUS POST TOTAL LEFT KNEE REPLACEMENT: Primary | ICD-10-CM

## 2023-09-21 PROCEDURE — 97110 THERAPEUTIC EXERCISES: CPT | Mod: GP | Performed by: PHYSICAL THERAPIST

## 2023-09-21 NOTE — PROGRESS NOTES
DISCHARGE  Reason for Discharge: Patient has met all goals.    Equipment Issued:     Discharge Plan: Patient to continue home program.    Referring Provider:  Dave Prince     09/21/23 0500   Appointment Info   Signing clinician's name / credentials Nayely Valencia, PT, DPT, OCS   Total/Authorized Visits 365   Visits Used 18   Medical Diagnosis S/P total knee arthroplasty, left   PT Tx Diagnosis knee OA, SP TK   Progress Note/Certification   Start of Care Date 05/08/23   Onset of illness/injury or Date of Surgery 10/05/22   Therapy Frequency 1x a week   Predicted Duration 4-8 weeks   Certification date from 08/15/23   Certification date to 10/10/23   GOALS   PT Goals 4   PT Goal 1   Goal Identifier 1   Goal Description pt will be able to sit to stand without pain   Rationale to maximize safety and independence with performance of ADLs and functional tasks   Goal Progress no pain, able to do with ease   Target Date 08/21/23   Date Met 08/15/23   PT Goal 2   Goal Identifier 2   Goal Description pt will be able to ascend stairs reciprocally with 1 rail   Rationale to maximize safety and independence with performance of ADLs and functional tasks   Target Date 06/19/23   Date Met 07/07/23   PT Goal 3   Goal Identifier 3   Goal Description pt will amb without AD without limp   Rationale to maximize safety and independence with performance of ADLs and functional tasks   Goal Progress no AD, no limp, still lacks a few deg of knee extension at heel strike   Target Date 08/21/23   Date Met 08/15/23   PT Goal 4   Goal Identifier New Goal   Goal Description Patient will be able to descend stairs reciprocally with 1 rail.   Target Date 10/10/23   Date Met 09/21/23   Subjective Report   Subjective Report Knee is doing well and everything is getting better.   Objective Measure 1   Objective Measure L Knee AAROM   Details 0 - 0 - 120 after rx.   Objective Measure 2   Objective Measure gait   Therapeutic Procedure/Exercise  "  Therapeutic Procedures: strength, endurance, ROM, flexibillity minutes (06841) 25   Ther Proc 1 - Details bike L4 (seat  2)  full revolution 5 min.  Stair flexion (with therapist PA glide of tibia) and extension.  8\" forward step down, 2 hand support 2 x 10, for stepping back up. supine heel slides w/sheet assist. reviewed which exercises to continue to focus on own with.   Skilled Intervention ROM, strength   Patient Response/Progress much improved knee flexion ROM with 8\" step down   Plan   Home program PTRX - on phone   Updates to plan of care discharge   Plan for next session pt to cont HEP on own   Total Session Time   Timed Code Treatment Minutes 25   Total Treatment Time (sum of timed and untimed services) 25         Please contact me with any questions or concerns.  Thank you for your referral.    Nayely Valencia, PT, DPT, OCS  Physical Therapist, Orthopedic Certified Specialist    [facility]  [address] [address]  [email or web address]   Office: [phone]   Employed by [employer]    "

## 2023-10-26 ENCOUNTER — HOSPITAL ENCOUNTER (OUTPATIENT)
Dept: MAMMOGRAPHY | Facility: CLINIC | Age: 68
Discharge: HOME | End: 2023-10-26
Attending: FAMILY MEDICINE | Admitting: FAMILY MEDICINE
Payer: MEDICARE

## 2023-10-26 DIAGNOSIS — Z12.31 VISIT FOR SCREENING MAMMOGRAM: ICD-10-CM

## 2023-10-26 PROCEDURE — 77067 SCR MAMMO BI INCL CAD: CPT

## 2024-01-15 ENCOUNTER — TELEPHONE (OUTPATIENT)
Dept: OBGYN | Facility: CLINIC | Age: 69
End: 2024-01-15
Payer: MEDICARE

## 2024-01-15 NOTE — TELEPHONE ENCOUNTER
"Panel Management Review        Health Maintenance List    Health Maintenance   Topic Date Due    DEXA  Never done    TSH W/FREE T4 REFLEX  Never done    ADVANCE CARE PLANNING  Never done    HEPATITIS C SCREENING  Never done    LIPID  Never done    RSV VACCINE (Pregnancy & 60+) (1 - 1-dose 60+ series) Never done    INFLUENZA VACCINE (1) 09/01/2023    COVID-19 Vaccine (5 - 2023-24 season) 09/01/2023    PHQ-2 (once per calendar year)  01/01/2024    COLORECTAL CANCER SCREENING  11/22/2023    FALL RISK ASSESSMENT  03/21/2024    MEDICARE ANNUAL WELLNESS VISIT  04/28/2024    MAMMO SCREENING  10/26/2024    DTAP/TDAP/TD IMMUNIZATION (3 - Td or Tdap) 04/23/2029    Pneumococcal Vaccine: 65+ Years  Completed    ZOSTER IMMUNIZATION  Completed    IPV IMMUNIZATION  Aged Out    HPV IMMUNIZATION  Aged Out    MENINGITIS IMMUNIZATION  Aged Out    RSV MONOCLONAL ANTIBODY  Aged Out    PAP  Discontinued       Composite cancer screening  Chart review shows that this patient is due/due soon for the following Colonoscopy  No results found for: \"PAP\"  Past Surgical History:   Procedure Laterality Date    DECOMPRESSION LUMBAR MINIMALLY INVASIVE TWO LEVELS N/A 10/6/2020    Procedure: Lumbar 4-5 bilateral decompression, Lumbar 5- Sacral 1 bilateral lateral decompression.;  Surgeon: Gary Silva MD;  Location: [facility]    DILATION AND CURETTAGE, HYSTEROSCOPY DIAGNOSTIC, COMBINED N/A 9/26/2022    Procedure: HYSTEROSCOPY, DIAGNOSTIC, WITH fractional DILATION AND CURETTAGE OF UTERUS;  Surgeon: Rio Fernando MD;  Location: [facility]    DILATION AND CURETTAGE, OPERATIVE HYSTEROSCOPY, COMBINED N/A 8/1/2016    Procedure: COMBINED DILATION AND CURETTAGE, OPERATIVE HYSTEROSCOPY;  Surgeon: Rio Fernando MD;  Location: [facility] OR    ENT SURGERY         Is hysterectomy listed in surgical history? No   Is mastectomy listed in surgical history? No     Summary:    Patient is due/failing the following:   Colonoscopy    Action needed: Patient needs " office visit for colonoscopy.    Type of outreach:  Sent [employer] message.      Staff Signature:  Rosalinda Jhaveri MA

## 2024-01-15 NOTE — LETTER
January 15, 2024      Shante Carrington  [address]  [place] [address]        Dear Shante,       To ensure we are providing the best quality care, we have reviewed your chart and see that you are due for:    Colon Cancer Screening:    If you have received a referral to schedule a Colonoscopy or choose to do a Colonoscopy instead of the FIT/ Cologuard test, please call [phone] to schedule.    If you have received a FIT test kit from a prior office visit, please check the expiration date. If , please get a new kit from the Lab/ Clinic. If not , please complete the test and mail in as soon as you are able.    If you would prefer to complete a Cologuard test, please reach out to your provider to see if this is an option for you.    You may call Dept: [phone] if you have any questions. If you have completed the tests outside of [facility], please have the results forwarded to our office Fax # [phone]. We will update the chart for your primary Physician to review before your next annual physical.          Sincerely,        Jessica Lopez MD

## 2024-06-22 ENCOUNTER — HEALTH MAINTENANCE LETTER (OUTPATIENT)
Age: 69
End: 2024-06-22

## 2024-10-21 ENCOUNTER — TRANSCRIBE ORDERS (OUTPATIENT)
Dept: OTHER | Age: 69
End: 2024-10-21

## 2024-10-21 DIAGNOSIS — M79.652 LEFT THIGH PAIN: Primary | ICD-10-CM

## 2024-10-27 ASSESSMENT — ACTIVITIES OF DAILY LIVING (ADL)
WALK: ACTIVITY IS SOMEWHAT DIFFICULT
HEAVY_WORK: EXTREME DIFFICULTY
LIMPING: THE SYMPTOM AFFECTS MY ACTIVITY SLIGHTLY
AS_A_RESULT_OF_YOUR_KNEE_INJURY,_HOW_WOULD_YOU_RATE_YOUR_CURRENT_LEVEL_OF_DAILY_ACTIVITY?: ABNORMAL
HOS_ADL_HIGHEST_POTENTIAL_SCORE: 64
WALKING_15_MINUTES_OR_GREATER: SLIGHT DIFFICULTY
HOW_WOULD_YOU_RATE_YOUR_CURRENT_LEVEL_OF_FUNCTION_DURING_YOUR_SPORTS_RELATED_ACTIVITIES_FROM_0_TO_100_WITH_100_BEING_YOUR_LEVEL_OF_FUNCTION_PRIOR_TO_YOUR_HIP_PROBLEM_AND_0_BEING_THE_INABILITY_TO_PERFORM_ANY_OF_YOUR_USUAL_DAILY_ACTIVITIES?: 75
STIFFNESS: THE SYMPTOM AFFECTS MY ACTIVITY MODERATELY
PUTTING_ON_SOCKS_AND_SHOES: MODERATE DIFFICULTY
PAIN: THE SYMPTOM AFFECTS MY ACTIVITY MODERATELY
GETTING_INTO_AND_OUT_OF_A_BATHTUB: MODERATE DIFFICULTY
WALKING_UP_STEEP_HILLS: MODERATE DIFFICULTY
TWISTING/PIVOTING_ON_INVOLVED_LEG: EXTREME DIFFICULTY
GOING_UP_1_FLIGHT_OF_STAIRS: MODERATE DIFFICULTY
SIT WITH YOUR KNEE BENT: ACTIVITY IS FAIRLY DIFFICULT
LIGHT_TO_MODERATE_WORK: MODERATE DIFFICULTY
SITTING FOR 15 MINUTES: SLIGHT DIFFICULTY
STEPPING UP AND DOWN CURBS: MODERATE DIFFICULTY
RAW_SCORE: 36
KNEEL ON THE FRONT OF YOUR KNEE: ACTIVITY IS FAIRLY DIFFICULT
GO DOWN STAIRS: ACTIVITY IS FAIRLY DIFFICULT
SPORTS_SCORE(%): 0
ROLLING OVER IN BED: MODERATE DIFFICULTY
PAIN: THE SYMPTOM AFFECTS MY ACTIVITY MODERATELY
LOW_IMPACT_ACTIVITIES_LIKE_FAST_WALKING: EXTREME DIFFICULTY
KNEE_ACTIVITY_OF_DAILY_LIVING_SUM: 36
WALKING_DOWN_STEEP_HILLS: MODERATE DIFFICULTY
SPORTS_TOTAL_ITEM_SCORE: 0
SWINGING_OBJECTS_LIKE_A_GOLF_CLUB: SLIGHT DIFFICULTY
GO DOWN STAIRS: ACTIVITY IS FAIRLY DIFFICULT
GO UP STAIRS: ACTIVITY IS MINIMALLY DIFFICULT
TWISTING/PIVOTING ON INVOLVED LEG: EXTREME DIFFICULTY
ADL_COUNT: 16
HOW_WOULD_YOU_RATE_THE_OVERALL_FUNCTION_OF_YOUR_KNEE_DURING_YOUR_USUAL_DAILY_ACTIVITIES?: ABNORMAL
GOING_DOWN_1_FLIGHT_OF_STAIRS: MODERATE DIFFICULTY
STAND: ACTIVITY IS FAIRLY DIFFICULT
STIFFNESS: THE SYMPTOM AFFECTS MY ACTIVITY MODERATELY
HEAVY_WORK: EXTREME DIFFICULTY
SIT WITH YOUR KNEE BENT: ACTIVITY IS FAIRLY DIFFICULT
GIVING WAY, BUCKLING OR SHIFTING OF KNEE: THE SYMPTOM AFFECTS MY ACTIVITY MODERATELY
SITTING_FOR_15_MINUTES: SLIGHT DIFFICULTY
ADL_HIGHEST_POTENTIAL_SCORE: 64
SPORTS_COUNT: 9
KNEEL ON THE FRONT OF YOUR KNEE: ACTIVITY IS FAIRLY DIFFICULT
ADL_TOTAL_ITEM_SCORE: INCOMPLETE
GETTING_INTO_AND_OUT_OF_AN_AVERAGE_CAR: SLIGHT DIFFICULTY
WALKING_FOR_APPROXIMATELY_10_MINUTES: NO DIFFICULTY AT ALL
PUTTING ON SOCKS AND SHOES: MODERATE DIFFICULTY
STANDING_FOR_15_MINUTES: MODERATE DIFFICULTY
SPORTS_HIGHEST_POTENTIAL_SCORE: 36
LIGHT_TO_MODERATE_WORK: MODERATE DIFFICULTY
CUTTING/LATERAL_MOVEMENTS: MODERATE DIFFICULTY
HOW_WOULD_YOU_RATE_YOUR_CURRENT_LEVEL_OF_FUNCTION_DURING_YOUR_USUAL_ACTIVITIES_OF_DAILY_LIVING_FROM_0_TO_100_WITH_100_BEING_YOUR_LEVEL_OF_FUNCTION_PRIOR_TO_YOUR_HIP_PROBLEM_AND_0_BEING_THE_INABILITY_TO_PERFORM_ANY_OF_YOUR_USUAL_DAILY_ACTIVITIES?: 75
GETTING_INTO_AND_OUT_OF_A_BATHTUB: MODERATE DIFFICULTY
WEAKNESS: THE SYMPTOM AFFECTS MY ACTIVITY SLIGHTLY
ABILITY_TO_PERFORM_ACTIVITY_WITH_YOUR_NORMAL_TECHNIQUE: MODERATE DIFFICULTY
HOS_ADL_SCORE(%): 56.25
DEEP_SQUATTING: MODERATE DIFFICULTY
ROLLING_OVER_IN_BED: MODERATE DIFFICULTY
STAND: ACTIVITY IS FAIRLY DIFFICULT
SQUAT: ACTIVITY IS FAIRLY DIFFICULT
WALKING_UP_STEEP_HILLS: MODERATE DIFFICULTY
WALKING_INITIALLY: NO DIFFICULTY AT ALL
STARTING_AND_STOPPING_QUICKLY: SLIGHT DIFFICULTY
ABILITY_TO_PARTICIPATE_IN_YOUR_DESIRED_SPORT_AS_LONG_AS_YOU_WOULD_LIKE: MODERATE DIFFICULTY
HOW_WOULD_YOU_RATE_THE_CURRENT_FUNCTION_OF_YOUR_KNEE_DURING_YOUR_USUAL_DAILY_ACTIVITIES_ON_A_SCALE_FROM_0_TO_100_WITH_100_BEING_YOUR_LEVEL_OF_KNEE_FUNCTION_PRIOR_TO_YOUR_INJURY_AND_0_BEING_THE_INABILITY_TO_PERFORM_ANY_OF_YOUR_USUAL_DAILY_ACTIVITIES?: 50
WALKING_APPROXIMATELY_10_MINUTES: NO DIFFICULTY AT ALL
STANDING FOR 15 MINUTES: MODERATE DIFFICULTY
HOW_WOULD_YOU_RATE_THE_OVERALL_FUNCTION_OF_YOUR_KNEE_DURING_YOUR_USUAL_DAILY_ACTIVITIES?: ABNORMAL
HOW_WOULD_YOU_RATE_YOUR_CURRENT_LEVEL_OF_FUNCTION_DURING_YOUR_USUAL_ACTIVITIES_OF_DAILY_LIVING_FROM_0_TO_100_WITH_100_BEING_YOUR_LEVEL_OF_FUNCTION_PRIOR_TO_YOUR_HIP_PROBLEM_AND_0_BEING_THE_INABILITY_TO_PERFORM_ANY_OF_YOUR_USUAL_DAILY_ACTIVITIES?: 75
HOW_WOULD_YOU_RATE_THE_CURRENT_FUNCTION_OF_YOUR_KNEE_DURING_YOUR_USUAL_DAILY_ACTIVITIES_ON_A_SCALE_FROM_0_TO_100_WITH_100_BEING_YOUR_LEVEL_OF_KNEE_FUNCTION_PRIOR_TO_YOUR_INJURY_AND_0_BEING_THE_INABILITY_TO_PERFORM_ANY_OF_YOUR_USUAL_DAILY_ACTIVITIES?: 50
LIMPING: THE SYMPTOM AFFECTS MY ACTIVITY SLIGHTLY
GOING DOWN 1 FLIGHT OF STAIRS: MODERATE DIFFICULTY
WALKING_15_MINUTES_OR_GREATER: SLIGHT DIFFICULTY
ADL_SCORE(%): INCOMPLETE
PLEASE_INDICATE_YOR_PRIMARY_REASON_FOR_REFERRAL_TO_THERAPY:: KNEE
GIVING WAY, BUCKLING OR SHIFTING OF KNEE: THE SYMPTOM AFFECTS MY ACTIVITY MODERATELY
STEPPING_UP_AND_DOWN_CURBS: MODERATE DIFFICULTY
SQUAT: ACTIVITY IS FAIRLY DIFFICULT
SWELLING: I DO NOT HAVE THE SYMPTOM
PLEASE_INDICATE_YOR_PRIMARY_REASON_FOR_REFERRAL_TO_THERAPY:: HIP
RISE FROM A CHAIR: ACTIVITY IS FAIRLY DIFFICULT
RISE FROM A CHAIR: ACTIVITY IS FAIRLY DIFFICULT
HOS_ADL_ITEM_SCORE_TOTAL: 36
KNEE_ACTIVITY_OF_DAILY_LIVING_SCORE: 51.43
WALKING_INITIALLY: NO DIFFICULTY AT ALL
GETTING INTO AND OUT OF AN AVERAGE CAR: SLIGHT DIFFICULTY
SWELLING: I DO NOT HAVE THE SYMPTOM
GO UP STAIRS: ACTIVITY IS MINIMALLY DIFFICULT
WEAKNESS: THE SYMPTOM AFFECTS MY ACTIVITY SLIGHTLY
DEEP SQUATTING: MODERATE DIFFICULTY
WALK: ACTIVITY IS SOMEWHAT DIFFICULT
GOING UP 1 FLIGHT OF STAIRS: MODERATE DIFFICULTY
WALKING_DOWN_STEEP_HILLS: MODERATE DIFFICULTY
AS_A_RESULT_OF_YOUR_KNEE_INJURY,_HOW_WOULD_YOU_RATE_YOUR_CURRENT_LEVEL_OF_DAILY_ACTIVITY?: ABNORMAL

## 2024-10-28 ENCOUNTER — HOSPITAL ENCOUNTER (OUTPATIENT)
Dept: MAMMOGRAPHY | Facility: CLINIC | Age: 69
Discharge: HOME | End: 2024-10-28
Attending: FAMILY MEDICINE | Admitting: FAMILY MEDICINE
Payer: MEDICARE

## 2024-10-28 DIAGNOSIS — Z12.31 VISIT FOR SCREENING MAMMOGRAM: ICD-10-CM

## 2024-10-28 PROCEDURE — 77063 BREAST TOMOSYNTHESIS BI: CPT

## 2024-10-31 ENCOUNTER — THERAPY VISIT (OUTPATIENT)
Dept: PHYSICAL THERAPY | Facility: CLINIC | Age: 69
End: 2024-10-31
Attending: FAMILY MEDICINE
Payer: MEDICARE

## 2024-10-31 DIAGNOSIS — M79.652 PAIN OF LEFT THIGH: Primary | ICD-10-CM

## 2024-10-31 PROCEDURE — 97110 THERAPEUTIC EXERCISES: CPT | Mod: GP | Performed by: PHYSICAL THERAPIST

## 2024-10-31 PROCEDURE — 97161 PT EVAL LOW COMPLEX 20 MIN: CPT | Mod: GP | Performed by: PHYSICAL THERAPIST

## 2024-10-31 NOTE — PROGRESS NOTES
PHYSICAL THERAPY EVALUATION  Type of Visit: Evaluation               Subjective   L lateral thigh, knee pain maybe since TKA in May 2023. Hurts side of lower thigh, runs up to hip and butt/back. Doesn't think it is her back, had L4-5 laminectomy for R sided sx.  Sx walking, stairs, squatting.      Presenting condition or subjective complaint: (Patient-Rptd) the pain is above the lft flt knee and runs up to hip which affects my lumbar spine back  Date of onset: 10/11/24 (MD appt date)    Relevant medical history: (Patient-Rptd) Arthritis; Menopause   Dates & types of surgery: (Patient-Rptd) back recess decompression [facility] 10/16/2020/ [facility] Lft total knee replacement 05/02/2023    Prior diagnostic imaging/testing results: (Patient-Rptd) MRI; X-ray     Prior therapy history for the same diagnosis, illness or injury: (Patient-Rptd) Yes (Patient-Rptd) PT after total lft knee replacement 05/02/2023    Prior Level of Function  Transfers: Independent  Ambulation: Independent  ADL: Independent  IADL:     Living Environment  Social support: (Patient-Rptd) With a significant other or spouse   Type of home: (Patient-Rptd) Multi-level   Stairs to enter the home: (Patient-Rptd) No       Ramp: (Patient-Rptd) No   Stairs inside the home: (Patient-Rptd) Yes (Patient-Rptd) 7 Is there a railing: (Patient-Rptd) Yes     Help at home: (Patient-Rptd) None  Equipment owned:       Employment: (Patient-Rptd) Not Applicable    Hobbies/Interests: (Patient-Rptd) pickleball/ gardening/ biking/ walking/ reading water aerobics, bike    Patient goals for therapy: (Patient-Rptd) have more flexibility, walking, climbing, putting socks/shoes on    Pain assessment:      Objective   KNEE EVALUATION  PAIN:  moderate  INTEGUMENTARY (edema, incisions):   POSTURE: L IC lower, decreased lordosis, mild valgus B  GAIT:  Weightbearing Status:   Assistive Device(s):   Gait Deviations:   BALANCE/PROPRIOCEPTION:   WEIGHTBEARING ALIGNMENT:   NON-WEIGHTBEARING  "ALIGNMENT:   ROM: LROM flex 100% pulls LB, % mild L LBP, SB L inc L butt, thigh pain  KNEE ROM L 105* flexion supine, 0* ext, hip WNL except exten mild tight B, prone KF 95* limited by knee, not quad    STRENGTH: knee exten, flex 5/5, hip abd 4+ L, 5R, glut med 4, R 5 , hip ext 5,   FLEXIBILITY: mild tight piriformis L, quad hp flexorB  SPECIAL TESTS: supine R ASIS inferior, +fwd bend R  FUNCTIONAL TESTS: squat show limited knee flexion, anterior knee migration, 6\" step down L difficult, fatigue in 10 reps  PALPATION: tender L mid/distal IT band, tender L piriformis/glut med insertion  JOINT MOBILITY:     Assessment & Plan   CLINICAL IMPRESSIONS  Medical Diagnosis: lateral thigh pain    Treatment Diagnosis: L hip weakness, decreased flexiblity, ITband tightness   Impression/Assessment: Patient is a 68 year old female with lateral thigh complaints.  The following significant findings have been identified: Pain, Decreased ROM/flexibility, and Decreased strength. These impairments interfere with their ability to perform recreational activities and community mobility as compared to previous level of function.     Clinical Decision Making (Complexity):  Clinical Presentation: Stable/Uncomplicated  Clinical Presentation Rationale: based on medical and personal factors listed in PT evaluation  Clinical Decision Making (Complexity): Low complexity    PLAN OF CARE   pt leaving stated she was mostly interested in exercises she could do, not aware she had another appt set, recommend she return to progress program   Treatment Interventions:  Interventions: Manual Therapy, Neuromuscular Re-education, Therapeutic Activity, Therapeutic Exercise    Long Term Goals     PT Goal 1  Goal Identifier: 1  Goal Description: will be anna to walk for exercise w/o thigh sx in 4wk  Target Date: 11/28/24  PT Goal 2  Goal Identifier: 2  Goal Description: will be able to do regular exercise/water aerobics w/o sx in 4wk  Target Date: " 11/28/24      Frequency of Treatment: 1x/wk  Duration of Treatment: 4wk    Recommended Referrals to Other Professionals:   Education Assessment:   Learner/Method: Patient;Pictures/Video;No Barriers to Learning    Risks and benefits of evaluation/treatment have been explained.   Patient/Family/caregiver agrees with Plan of Care.     Evaluation Time:     PT Eval, Low Complexity Minutes (41419): 20       Signing Clinician: Kris Hoenk, PT [facility]                                                                                   OUTPATIENT PHYSICAL THERAPY      PLAN OF TREATMENT FOR OUTPATIENT REHABILITATION   Patient's Last Name, First Name, Sukhwinder Paezmy  JUN YOB: 1955   Provider's Name   [facility]   Medical Record No.  [id number]     Onset Date: 10/11/24 (MD appt date)  Start of Care Date: 10/31/24     Medical Diagnosis:  lateral thigh pain      PT Treatment Diagnosis:  L hip weakness, decreased flexiblity, ITband tightness Plan of Treatment  Frequency/Duration: 1x/wk/ 4wk    Certification date from 10/31/24 to 11/28/24         See note for plan of treatment details and functional goals     Kris Hoenk, PT                         I CERTIFY THE NEED FOR THESE SERVICES FURNISHED UNDER        THIS PLAN OF TREATMENT AND WHILE UNDER MY CARE .             Physician Signature               Date    X_____________________________________________________                  Referring Provider:  Pricilla Hilario    Initial Assessment  See Epic Evaluation- Start of Care Date: 10/31/24

## 2025-03-31 ENCOUNTER — ANCILLARY PROCEDURE (OUTPATIENT)
Dept: CT IMAGING | Facility: CLINIC | Age: 70
End: 2025-03-31
Attending: ORTHOPAEDIC SURGERY
Payer: MEDICARE

## 2025-03-31 DIAGNOSIS — M25.562 LEFT KNEE PAIN: ICD-10-CM

## 2025-03-31 DIAGNOSIS — Z96.659 S/P TOTAL KNEE ARTHROPLASTY: ICD-10-CM

## 2025-03-31 PROCEDURE — 73700 CT LOWER EXTREMITY W/O DYE: CPT | Mod: LT | Performed by: RADIOLOGY

## 2025-04-15 ENCOUNTER — TRANSFERRED RECORDS (OUTPATIENT)
Dept: HEALTH INFORMATION MANAGEMENT | Facility: CLINIC | Age: 70
End: 2025-04-15

## 2025-05-15 ENCOUNTER — MEDICAL CORRESPONDENCE (OUTPATIENT)
Dept: HEALTH INFORMATION MANAGEMENT | Facility: CLINIC | Age: 70
End: 2025-05-15
Payer: MEDICARE

## 2025-06-17 ENCOUNTER — APPOINTMENT (OUTPATIENT)
Dept: CT IMAGING | Facility: CLINIC | Age: 70
End: 2025-06-17
Attending: STUDENT IN AN ORGANIZED HEALTH CARE EDUCATION/TRAINING PROGRAM
Payer: MEDICARE

## 2025-06-17 ENCOUNTER — HOSPITAL ENCOUNTER (EMERGENCY)
Facility: CLINIC | Age: 70
Discharge: HOME | End: 2025-06-18
Attending: STUDENT IN AN ORGANIZED HEALTH CARE EDUCATION/TRAINING PROGRAM | Admitting: STUDENT IN AN ORGANIZED HEALTH CARE EDUCATION/TRAINING PROGRAM
Payer: MEDICARE

## 2025-06-17 DIAGNOSIS — M54.2 NECK PAIN: ICD-10-CM

## 2025-06-17 DIAGNOSIS — M54.9 BACK PAIN: Primary | ICD-10-CM

## 2025-06-17 DIAGNOSIS — R51.9 ACUTE NONINTRACTABLE HEADACHE, UNSPECIFIED HEADACHE TYPE: ICD-10-CM

## 2025-06-17 DIAGNOSIS — R25.2 MUSCLE CRAMPING: ICD-10-CM

## 2025-06-17 LAB
ALBUMIN SERPL BCG-MCNC: 4.7 G/DL (ref 3.5–5.2)
ALP SERPL-CCNC: 107 U/L (ref 40–150)
ALT SERPL W P-5'-P-CCNC: 39 U/L (ref 0–50)
ANION GAP SERPL CALCULATED.3IONS-SCNC: 17 MMOL/L (ref 7–15)
AST SERPL W P-5'-P-CCNC: 30 U/L (ref 0–45)
BASOPHILS # BLD AUTO: 0.1 10E3/UL (ref 0–0.2)
BASOPHILS NFR BLD AUTO: 1 %
BILIRUB SERPL-MCNC: 1 MG/DL
BUN SERPL-MCNC: 11.5 MG/DL (ref 8–23)
CALCIUM SERPL-MCNC: 9.4 MG/DL (ref 8.8–10.4)
CHLORIDE SERPL-SCNC: 98 MMOL/L (ref 98–107)
CREAT SERPL-MCNC: 0.65 MG/DL (ref 0.51–0.95)
EGFRCR SERPLBLD CKD-EPI 2021: >90 ML/MIN/1.73M2
EOSINOPHIL # BLD AUTO: 0.1 10E3/UL (ref 0–0.7)
EOSINOPHIL NFR BLD AUTO: 1 %
ERYTHROCYTE [DISTWIDTH] IN BLOOD BY AUTOMATED COUNT: 12.2 % (ref 10–15)
GLUCOSE SERPL-MCNC: 169 MG/DL (ref 70–99)
HCO3 SERPL-SCNC: 18 MMOL/L (ref 22–29)
HCT VFR BLD AUTO: 41.8 % (ref 35–47)
HGB BLD-MCNC: 14.6 G/DL (ref 11.7–15.7)
IMM GRANULOCYTES # BLD: 0 10E3/UL
IMM GRANULOCYTES NFR BLD: 0 %
LYMPHOCYTES # BLD AUTO: 2.4 10E3/UL (ref 0.8–5.3)
LYMPHOCYTES NFR BLD AUTO: 28 %
MAGNESIUM SERPL-MCNC: 1.8 MG/DL (ref 1.7–2.3)
MCH RBC QN AUTO: 29.6 PG (ref 26.5–33)
MCHC RBC AUTO-ENTMCNC: 34.9 G/DL (ref 31.5–36.5)
MCV RBC AUTO: 85 FL (ref 78–100)
MONOCYTES # BLD AUTO: 1 10E3/UL (ref 0–1.3)
MONOCYTES NFR BLD AUTO: 11 %
NEUTROPHILS # BLD AUTO: 5.1 10E3/UL (ref 1.6–8.3)
NEUTROPHILS NFR BLD AUTO: 60 %
NRBC # BLD AUTO: 0 10E3/UL
NRBC BLD AUTO-RTO: 0 /100
PLATELET # BLD AUTO: 242 10E3/UL (ref 150–450)
POTASSIUM SERPL-SCNC: 3.4 MMOL/L (ref 3.4–5.3)
PROT SERPL-MCNC: 7.2 G/DL (ref 6.4–8.3)
RBC # BLD AUTO: 4.94 10E6/UL (ref 3.8–5.2)
SODIUM SERPL-SCNC: 133 MMOL/L (ref 135–145)
TROPONIN T SERPL HS-MCNC: 7 NG/L
WBC # BLD AUTO: 8.6 10E3/UL (ref 4–11)

## 2025-06-17 PROCEDURE — 82550 ASSAY OF CK (CPK): CPT | Performed by: STUDENT IN AN ORGANIZED HEALTH CARE EDUCATION/TRAINING PROGRAM

## 2025-06-17 PROCEDURE — 72125 CT NECK SPINE W/O DYE: CPT

## 2025-06-17 PROCEDURE — 250N000009 HC RX 250: Performed by: STUDENT IN AN ORGANIZED HEALTH CARE EDUCATION/TRAINING PROGRAM

## 2025-06-17 PROCEDURE — 85004 AUTOMATED DIFF WBC COUNT: CPT | Performed by: STUDENT IN AN ORGANIZED HEALTH CARE EDUCATION/TRAINING PROGRAM

## 2025-06-17 PROCEDURE — 250N000011 HC RX IP 250 OP 636: Mod: JZ | Performed by: STUDENT IN AN ORGANIZED HEALTH CARE EDUCATION/TRAINING PROGRAM

## 2025-06-17 PROCEDURE — 84155 ASSAY OF PROTEIN SERUM: CPT | Performed by: STUDENT IN AN ORGANIZED HEALTH CARE EDUCATION/TRAINING PROGRAM

## 2025-06-17 PROCEDURE — 99284 EMERGENCY DEPT VISIT MOD MDM: CPT | Performed by: STUDENT IN AN ORGANIZED HEALTH CARE EDUCATION/TRAINING PROGRAM

## 2025-06-17 PROCEDURE — 96375 TX/PRO/DX INJ NEW DRUG ADDON: CPT | Performed by: STUDENT IN AN ORGANIZED HEALTH CARE EDUCATION/TRAINING PROGRAM

## 2025-06-17 PROCEDURE — 83735 ASSAY OF MAGNESIUM: CPT | Performed by: STUDENT IN AN ORGANIZED HEALTH CARE EDUCATION/TRAINING PROGRAM

## 2025-06-17 PROCEDURE — 96361 HYDRATE IV INFUSION ADD-ON: CPT | Performed by: STUDENT IN AN ORGANIZED HEALTH CARE EDUCATION/TRAINING PROGRAM

## 2025-06-17 PROCEDURE — 250N000011 HC RX IP 250 OP 636: Performed by: STUDENT IN AN ORGANIZED HEALTH CARE EDUCATION/TRAINING PROGRAM

## 2025-06-17 PROCEDURE — 258N000003 HC RX IP 258 OP 636: Performed by: STUDENT IN AN ORGANIZED HEALTH CARE EDUCATION/TRAINING PROGRAM

## 2025-06-17 PROCEDURE — 70496 CT ANGIOGRAPHY HEAD: CPT

## 2025-06-17 PROCEDURE — 99285 EMERGENCY DEPT VISIT HI MDM: CPT | Mod: 25 | Performed by: STUDENT IN AN ORGANIZED HEALTH CARE EDUCATION/TRAINING PROGRAM

## 2025-06-17 PROCEDURE — 84484 ASSAY OF TROPONIN QUANT: CPT | Performed by: STUDENT IN AN ORGANIZED HEALTH CARE EDUCATION/TRAINING PROGRAM

## 2025-06-17 PROCEDURE — 36415 COLL VENOUS BLD VENIPUNCTURE: CPT | Performed by: STUDENT IN AN ORGANIZED HEALTH CARE EDUCATION/TRAINING PROGRAM

## 2025-06-17 PROCEDURE — 70450 CT HEAD/BRAIN W/O DYE: CPT | Mod: XU

## 2025-06-17 RX ORDER — IOPAMIDOL 755 MG/ML
67 INJECTION, SOLUTION INTRAVASCULAR ONCE
Status: COMPLETED | OUTPATIENT
Start: 2025-06-17 | End: 2025-06-17

## 2025-06-17 RX ORDER — HYDROMORPHONE HYDROCHLORIDE 1 MG/ML
0.5 INJECTION, SOLUTION INTRAMUSCULAR; INTRAVENOUS; SUBCUTANEOUS
Refills: 0 | Status: COMPLETED | OUTPATIENT
Start: 2025-06-17 | End: 2025-06-17

## 2025-06-17 RX ORDER — METOCLOPRAMIDE HYDROCHLORIDE 5 MG/ML
5 INJECTION INTRAMUSCULAR; INTRAVENOUS ONCE
Status: COMPLETED | OUTPATIENT
Start: 2025-06-17 | End: 2025-06-17

## 2025-06-17 RX ADMIN — SODIUM CHLORIDE, SODIUM LACTATE, POTASSIUM CHLORIDE, AND CALCIUM CHLORIDE 1000 ML: .6; .31; .03; .02 INJECTION, SOLUTION INTRAVENOUS at 22:56

## 2025-06-17 RX ADMIN — METOCLOPRAMIDE 5 MG: 5 INJECTION, SOLUTION INTRAMUSCULAR; INTRAVENOUS at 23:09

## 2025-06-17 RX ADMIN — IOPAMIDOL 67 ML: 755 INJECTION, SOLUTION INTRAVENOUS at 23:22

## 2025-06-17 RX ADMIN — HYDROMORPHONE HYDROCHLORIDE 0.5 MG: 1 INJECTION, SOLUTION INTRAMUSCULAR; INTRAVENOUS; SUBCUTANEOUS at 23:09

## 2025-06-17 RX ADMIN — SODIUM CHLORIDE 100 ML: 9 INJECTION, SOLUTION INTRAVENOUS at 23:22

## 2025-06-17 ASSESSMENT — ACTIVITIES OF DAILY LIVING (ADL): ADLS_ACUITY_SCORE: 44

## 2025-06-18 VITALS
HEIGHT: 64 IN | WEIGHT: 148 LBS | SYSTOLIC BLOOD PRESSURE: 111 MMHG | HEART RATE: 78 BPM | RESPIRATION RATE: 19 BRPM | BODY MASS INDEX: 25.27 KG/M2 | OXYGEN SATURATION: 98 % | TEMPERATURE: 97 F | DIASTOLIC BLOOD PRESSURE: 70 MMHG

## 2025-06-18 LAB
CK SERPL-CCNC: 364 U/L (ref 26–192)
HOLD SPECIMEN: NORMAL

## 2025-06-18 PROCEDURE — 250N000013 HC RX MED GY IP 250 OP 250 PS 637: Performed by: STUDENT IN AN ORGANIZED HEALTH CARE EDUCATION/TRAINING PROGRAM

## 2025-06-18 PROCEDURE — 96365 THER/PROPH/DIAG IV INF INIT: CPT | Mod: 59 | Performed by: STUDENT IN AN ORGANIZED HEALTH CARE EDUCATION/TRAINING PROGRAM

## 2025-06-18 PROCEDURE — 250N000011 HC RX IP 250 OP 636: Performed by: STUDENT IN AN ORGANIZED HEALTH CARE EDUCATION/TRAINING PROGRAM

## 2025-06-18 RX ORDER — DIAZEPAM 5 MG/1
5 TABLET ORAL ONCE
Status: COMPLETED | OUTPATIENT
Start: 2025-06-18 | End: 2025-06-18

## 2025-06-18 RX ORDER — DIAZEPAM 5 MG/1
5 TABLET ORAL EVERY 6 HOURS PRN
Qty: 10 TABLET | Refills: 0 | Status: SHIPPED | OUTPATIENT
Start: 2025-06-18

## 2025-06-18 RX ORDER — MAGNESIUM SULFATE HEPTAHYDRATE 40 MG/ML
2 INJECTION, SOLUTION INTRAVENOUS ONCE
Status: COMPLETED | OUTPATIENT
Start: 2025-06-18 | End: 2025-06-18

## 2025-06-18 RX ADMIN — MAGNESIUM SULFATE HEPTAHYDRATE 2 G: 40 INJECTION, SOLUTION INTRAVENOUS at 00:42

## 2025-06-18 RX ADMIN — DIAZEPAM 5 MG: 5 TABLET ORAL at 00:42

## 2025-06-18 ASSESSMENT — ACTIVITIES OF DAILY LIVING (ADL)
ADLS_ACUITY_SCORE: 44
ADLS_ACUITY_SCORE: 44

## 2025-06-18 NOTE — DISCHARGE INSTRUCTIONS
All of your imaging was normal.  Your labs were all reassuring.  I am not exactly sure what caused your symptoms today.  It may have been due to you overdoing it and not eating and drinking enough.  Your symptoms improved with fluids, Valium and magnesium.  Make sure you are getting plenty of rest and drinking lots of fluids and try to eat some nutritious food.  Make sure you follow-up with your regular doctor for recheck.  You can use the Valium as needed.  You can also use Tylenol or ibuprofen as needed.  Return to the emergency department if you develop severe or concerning symptoms

## 2025-06-18 NOTE — ED PROVIDER NOTES
"  History     Chief Complaint   Patient presents with    Altered Mental Status     HPI  Shante Carrington is a 69 year old female who has hypothyroidism, chronic kidney disease, hyperlipidemia, sleep apnea, who presents to the emergency department for evaluation of generalized weakness.  Patient is a poor historian at this time.  Reportedly she was out in the garden all day and then her  found her in bed moaning and complaining of pain \"all over.\"  She feels like her entire body is cramping.  Unclear if she passed out.  She vomited and had some diarrhea.  Medics report there was a period where she was not answering questions and seemed like she was \"out of it.\"  They thought that she was leaning to the left.  Upon arrival to the ER, patient is alert and speaking.  She is complaining of pain in her feet from chronic neuropathy.  She feels nauseated and generally weak.  She denies hitting her head.  No chest pain or trouble breathing.  She complaining of neck pain.  She denies any falls or injury.  She is also got a left-sided headache.  She denies recent fever.  Denies abdominal pain.  Diarrhea has been nonbloody.  States she was doing fine prior to all of this.  She denies alcohol or drugs.  No new medications.    Further history obtained from her  Jerry.  He states that patient has been working outside and going \"too hard\" over the last couple of days.  He states that after dinner he heard the patient crying and he found her in bed moaning and complaining of neck pain and a headache.  He has never seen her like this before.    Allergies:  Allergies   Allergen Reactions    Percocet [Oxycodone-Acetaminophen] Other (See Comments)     hallucinations    Zofran [Ondansetron] Other (See Comments)     headache    Venlafaxine Anxiety and Other (See Comments)     MIND RACING    Zolpidem Anxiety     Interacted with antidepressant and REM sleep       Problem List:    Patient Active Problem List    Diagnosis Date " Noted    Pain of left thigh 10/31/2024     Priority: Medium    Status post total left knee replacement 05/22/2023     Priority: Medium    S/P lumbar laminectomy 10/06/2020     Priority: Medium    Cervicitis and endocervicitis 06/22/2016     Priority: Medium    Postcoital bleeding 06/22/2016     Priority: Medium    Post-menopausal bleeding 06/22/2016     Priority: Medium        Past Medical History:    Past Medical History:   Diagnosis Date    Hypothyroidism     Need for prophylactic hormone replacement therapy (postmenopausal)     PONV (postoperative nausea and vomiting)     Prediabetes     Rosacea     Spinal stenosis        Past Surgical History:    Past Surgical History:   Procedure Laterality Date    DECOMPRESSION LUMBAR MINIMALLY INVASIVE TWO LEVELS N/A 10/6/2020    Procedure: Lumbar 4-5 bilateral decompression, Lumbar 5- Sacral 1 bilateral lateral decompression.;  Surgeon: Gary Silva MD;  Location: [facility] OR    DILATION AND CURETTAGE, HYSTEROSCOPY DIAGNOSTIC, COMBINED N/A 9/26/2022    Procedure: HYSTEROSCOPY, DIAGNOSTIC, WITH fractional DILATION AND CURETTAGE OF UTERUS;  Surgeon: Rio Fernando MD;  Location: [facility] OR    DILATION AND CURETTAGE, OPERATIVE HYSTEROSCOPY, COMBINED N/A 8/1/2016    Procedure: COMBINED DILATION AND CURETTAGE, OPERATIVE HYSTEROSCOPY;  Surgeon: Rio Fernando MD;  Location: [facility] OR    ENT SURGERY         Family History:    No family history on file.    Social History:  Marital Status:   [2]  Social History     Tobacco Use    Smoking status: Never    Smokeless tobacco: Never   Vaping Use    Vaping status: Never Used   Substance Use Topics    Alcohol use: Yes     Comment: rarely    Drug use: No        Medications:    diazepam (VALIUM) 5 MG tablet  acetaminophen (TYLENOL) 325 MG tablet  Ascorbic Acid (VITAMIN C PO)  calcium carbonate (OS-DOUG 500 MG [place]. [place]) 500 MG tablet  cetirizine (ZYRTEC) 10 MG tablet  Cholecalciferol (VITAMIN D PO)  citalopram (CELEXA) 40 MG  "tablet  Cyanocobalamin (B-12) 1000 MCG TBCR  ESTRADIOL PO  gabapentin (NEURONTIN) 300 MG capsule  glucosamine-chondroitin 500-400 MG CAPS per capsule  ibuprofen (ADVIL/MOTRIN) 200 MG tablet  levothyroxine (SYNTHROID, LEVOTHROID) 50 MCG tablet  meclizine (ANTIVERT) 25 MG tablet  Omega-3 Fatty Acids (OMEGA-3 FISH OIL PO)  omeprazole (PRILOSEC) 20 MG capsule  OTHER MEDICAL SUPPLIES  progesterone (PROMETRIUM) 100 MG capsule  traMADol (ULTRAM) 50 MG tablet  traZODone (DESYREL) 100 MG tablet          Review of Systems  See HPI  Physical Exam   BP: (!) 148/74  Pulse: 89  Temp: 97  F (36.1  C)  Resp: 18  Height: 162.6 cm (5' 4\")  Weight: 67.1 kg (148 lb)  SpO2: 99 %      Physical Exam  BP (!) 140/78   Pulse 80   Temp 97  F (36.1  C) (Tympanic)   Resp 26   Ht 1.626 m (5' 4\")   Wt 67.1 kg (148 lb)   SpO2 99%   BMI 25.40 kg/m    General: Distressed, sitting up in bed, holding an emesis bag, alert and answering questions appropriately  Head: atraumatic  Nose: no rhinorrhea or epistaxis  Ears: no external auditory canal discharge or bleeding.    Eyes: Sclera nonicteric. Conjunctiva noninjected. PERRL, EOMI  Mouth: no tonsillar erythema, edema, or exudate.  Moist mucous membranes  Neck: supple, moving spontaneously no midline cervical tenderness  Lungs: No increased work of breathing.  Clear to auscultation bilaterally.  CV: RRR, peripheral pulses palpable and symmetric  Abdomen: soft, nt, nd, no guarding or rebound.   Extremities: Warm and well-perfused.    Skin: no rash or diaphoresis  Neuro: CN II-XII grossly intact, strength 5/5 in UE and LEs bilaterally, sensation intact to light touch in UE and LEs bilaterally; following commands, moving all extremities spontaneously.  She is noted to have some jerking-like movements that occur randomly.    ED Course        Procedures              EKG Interpretation:      Interpreted by Michael Miller MD  Time reviewed: 1:53 AM    Symptoms at time of EKG: None   Rhythm: normal sinus "   Rate: Normal  Axis: Normal  Ectopy: none  Conduction: normal  ST Segments/ T Waves: No acute ischemic changes  Q Waves: none  Comparison to prior: No old EKG available    Clinical Impression: Sinus rhythm without any acute ischemic changes.  No dysrhythmia.  QTc of 484 ms noted.            Critical Care time:  none             Results for orders placed or performed during the hospital encounter of 06/17/25 (from the past 24 hours)   [place] Draw    Narrative    The following orders were created for panel order [place] Draw.  Procedure                               Abnormality         Status                     ---------                               -----------         ------                     Extra Blue Top Tube[[id number]]                             Final result               Extra Green Top (Lithiu...[[id number]]                      Final result               Extra Purple Top Tube[[id number]]                           Final result                 Please view results for these tests on the individual orders.   Extra Blue Top Tube   Result Value Ref Range    Hold Specimen JIC    Extra Green Top (Lithium Heparin) Tube   Result Value Ref Range    Hold Specimen JIC    Extra Purple Top Tube   Result Value Ref Range    Hold Specimen JIC    CBC with platelets differential    Narrative    The following orders were created for panel order CBC with platelets differential.  Procedure                               Abnormality         Status                     ---------                               -----------         ------                     CBC with platelets and ...[[id number]]                      Final result                 Please view results for these tests on the individual orders.   Comprehensive metabolic panel   Result Value Ref Range    Sodium 133 (L) 135 - 145 mmol/L    Potassium 3.4 3.4 - 5.3 mmol/L    Carbon Dioxide (CO2) 18 (L) 22 - 29 mmol/L    Anion Gap 17 (H) 7 - 15 mmol/L    Urea Nitrogen 11.5  8.0 - 23.0 mg/dL    Creatinine 0.65 0.51 - 0.95 mg/dL    GFR Estimate >90 >60 mL/min/1.73m2    Calcium 9.4 8.8 - 10.4 mg/dL    Chloride 98 98 - 107 mmol/L    Glucose 169 (H) 70 - 99 mg/dL    Alkaline Phosphatase 107 40 - 150 U/L    AST 30 0 - 45 U/L    ALT 39 0 - 50 U/L    Protein Total 7.2 6.4 - 8.3 g/dL    Albumin 4.7 3.5 - 5.2 g/dL    Bilirubin Total 1.0 <=1.2 mg/dL   Magnesium   Result Value Ref Range    Magnesium 1.8 1.7 - 2.3 mg/dL   Troponin T, High Sensitivity   Result Value Ref Range    Troponin T, High Sensitivity 7 <=14 ng/L   CBC with platelets and differential   Result Value Ref Range    WBC Count 8.6 4.0 - 11.0 10e3/uL    RBC Count 4.94 3.80 - 5.20 10e6/uL    Hemoglobin 14.6 11.7 - 15.7 g/dL    Hematocrit 41.8 35.0 - 47.0 %    MCV 85 78 - 100 fL    MCH 29.6 26.5 - 33.0 pg    MCHC 34.9 31.5 - 36.5 g/dL    RDW 12.2 10.0 - 15.0 %    Platelet Count 242 150 - 450 10e3/uL    % Neutrophils 60 %    % Lymphocytes 28 %    % Monocytes 11 %    % Eosinophils 1 %    % Basophils 1 %    % Immature Granulocytes 0 %    NRBCs per 100 WBC 0 <1 /100    Absolute Neutrophils 5.1 1.6 - 8.3 10e3/uL    Absolute Lymphocytes 2.4 0.8 - 5.3 10e3/uL    Absolute Monocytes 1.0 0.0 - 1.3 10e3/uL    Absolute Eosinophils 0.1 0.0 - 0.7 10e3/uL    Absolute Basophils 0.1 0.0 - 0.2 10e3/uL    Absolute Immature Granulocytes 0.0 <=0.4 10e3/uL    Absolute NRBCs 0.0 10e3/uL   CK total   Result Value Ref Range     (H) 26 - 192 U/L   CBC with platelets differential *Canceled*    Narrative    The following orders were created for panel order CBC with platelets differential.  Procedure                               Abnormality         Status                     ---------                               -----------         ------                       Please view results for these tests on the individual orders.   EKG 12-lead, tracing only   Result Value Ref Range    Systolic Blood Pressure  mmHg    Diastolic Blood Pressure  mmHg    Ventricular  Rate 88 BPM    Atrial Rate 88 BPM    MD Interval 136 ms    QRS Duration 84 ms     ms    QTc 484 ms    P Axis 76 degrees    R AXIS 79 degrees    T Axis 63 degrees    Interpretation ECG       Sinus rhythm  Nonspecific ST abnormality  QTcB >= 480 msec  Abnormal ECG  No previous ECGs available     CT Head w/o Contrast    Narrative    EXAM: CT HEAD W/O CONTRAST, CTA HEAD NECK W CONTRAST  LOCATION: [facility]  DATE: 6/17/2025    INDICATION: Syncope, headache and vomiting  COMPARISON: None.  CONTRAST: 67 ml Isovue 370  TECHNIQUE: Head and neck CT angiogram with IV contrast. Noncontrast head CT followed by axial helical CT images of the head and neck vessels obtained during the arterial phase of intravenous contrast administration. Axial 2D reconstructed images and   multiplanar 3D MIP reconstructed images of the head and neck vessels were performed by the technologist. Dose reduction techniques were used. All stenosis measurements made according to NASCET criteria unless otherwise specified.    FINDINGS:   NONCONTRAST HEAD CT:   INTRACRANIAL CONTENTS: No intracranial hemorrhage, extraaxial collection, or mass effect.  No CT evidence of acute infarct. Mild volume loss and presumed chronic small vessel ischemia.    VISUALIZED ORBITS/SINUSES/MASTOIDS: No intraorbital abnormality. No paranasal sinus mucosal disease. No middle ear or mastoid effusion.    BONES/SOFT TISSUES: No acute abnormality.    HEAD CTA:  ANTERIOR CIRCULATION: No stenosis/occlusion, aneurysm, or high flow vascular malformation.    POSTERIOR CIRCULATION: No stenosis/occlusion, aneurysm, or high flow vascular malformation. Dominant right and smaller left vertebral arteries supply a normal basilar artery. Fetal origin of the posterior cerebral arteries.    DURAL VENOUS SINUSES: Expected enhancement of the major dural venous sinuses.    NECK CTA:  RIGHT CAROTID: No measurable stenosis or dissection.    LEFT CAROTID: No  measurable stenosis or dissection.    VERTEBRAL ARTERIES: No focal stenosis or dissection. Dominant right and smaller left vertebral arteries.    AORTIC ARCH: Classic aortic arch anatomy with no significant stenosis at the origin of the great vessels.    NONVASCULAR STRUCTURES: Unremarkable.      Impression    IMPRESSION:   HEAD CT:  1.  No acute intracranial abnormality    2.  Mild age-related change.     HEAD CTA:   1.  Normal CTA [place].    NECK CTA:  1.  Normal neck CTA.   CTA Head Neck with Contrast    Narrative    EXAM: CT HEAD W/O CONTRAST, CTA HEAD NECK W CONTRAST  LOCATION: [facility]  DATE: 6/17/2025    INDICATION: Syncope, headache and vomiting  COMPARISON: None.  CONTRAST: 67 ml Isovue 370  TECHNIQUE: Head and neck CT angiogram with IV contrast. Noncontrast head CT followed by axial helical CT images of the head and neck vessels obtained during the arterial phase of intravenous contrast administration. Axial 2D reconstructed images and   multiplanar 3D MIP reconstructed images of the head and neck vessels were performed by the technologist. Dose reduction techniques were used. All stenosis measurements made according to NASCET criteria unless otherwise specified.    FINDINGS:   NONCONTRAST HEAD CT:   INTRACRANIAL CONTENTS: No intracranial hemorrhage, extraaxial collection, or mass effect.  No CT evidence of acute infarct. Mild volume loss and presumed chronic small vessel ischemia.    VISUALIZED ORBITS/SINUSES/MASTOIDS: No intraorbital abnormality. No paranasal sinus mucosal disease. No middle ear or mastoid effusion.    BONES/SOFT TISSUES: No acute abnormality.    HEAD CTA:  ANTERIOR CIRCULATION: No stenosis/occlusion, aneurysm, or high flow vascular malformation.    POSTERIOR CIRCULATION: No stenosis/occlusion, aneurysm, or high flow vascular malformation. Dominant right and smaller left vertebral arteries supply a normal basilar artery. Fetal origin of the  posterior cerebral arteries.    DURAL VENOUS SINUSES: Expected enhancement of the major dural venous sinuses.    NECK CTA:  RIGHT CAROTID: No measurable stenosis or dissection.    LEFT CAROTID: No measurable stenosis or dissection.    VERTEBRAL ARTERIES: No focal stenosis or dissection. Dominant right and smaller left vertebral arteries.    AORTIC ARCH: Classic aortic arch anatomy with no significant stenosis at the origin of the great vessels.    NONVASCULAR STRUCTURES: Unremarkable.      Impression    IMPRESSION:   HEAD CT:  1.  No acute intracranial abnormality    2.  Mild age-related change.     HEAD CTA:   1.  Normal CTA [place].    NECK CTA:  1.  Normal neck CTA.   CT Cervical Spine w/o Contrast    Narrative    EXAM: CT CERVICAL SPINE W/O CONTRAST  LOCATION: [facility]  DATE: 6/17/2025    INDICATION: Syncope, neck pain.  COMPARISON: None.  TECHNIQUE: Routine CT Cervical Spine without IV contrast. Multiplanar reformats. Dose reduction techniques were used.    FINDINGS:  VERTEBRA: Straightening of the normal cervical lordosis. Alignment is otherwise normal. No acute cervical spine fracture or posttraumatic subluxation. Mild multilevel degenerative disc disease and mild-to-moderate multilevel facet arthropathy apart from   marked changes on the right at C4-C5.    CANAL/FORAMINA: Mild multilevel degenerative canal narrowing. Marked bilateral C4-C5 and left C6-C7 degenerative foraminal narrowing.    PARASPINAL: No extraspinal abnormality.      Impression    IMPRESSION:  1.  No acute cervical spine fracture.       Medications   metoclopramide (REGLAN) injection 5 mg (5 mg Intravenous $Given 6/17/25 2309)   lactated ringers BOLUS 1,000 mL (0 mLs Intravenous Stopped 6/18/25 0031)   iopamidol (ISOVUE-370) solution 67 mL (67 mLs Intravenous $Given 6/17/25 4962)   sodium chloride 0.9 % bag for CT scan flush (100 mLs As instructed $Given 6/17/25 2322)   HYDROmorphone (PF) (DILAUDID)  injection 0.5 mg (0.5 mg Intravenous $Given 6/17/25 6125)   magnesium sulfate 2 g in 50 mL sterile water intermittent infusion (0 g Intravenous Stopped 6/18/25 0138)   diazepam (VALIUM) tablet 5 mg (5 mg Oral $Given 6/18/25 0042)       Assessments & Plan (with Medical Decision Making)     I have reviewed the nursing notes.    I have reviewed the findings, diagnosis, plan and need for follow up with the patient.          Medical Decision Making  Shante Carrington is a 69 year old female who has hypothyroidism, chronic kidney disease, hyperlipidemia, sleep apnea, who presents to the emergency department for evaluation of generalized weakness.  Vital signs reviewed notable for hypertension otherwise reassuring.  Unclear what is going on with the patient.  She does not have any focal neurological deficits, but is complaining of a headache and neck pain.  She is having some random jerking of her extremities that she is describing is muscle spasms.  I am planning for a broad workup on her.  Going to give her some IV fluids, Reglan and Dilaudid.  EKG nonischemic.  Plan for CT of the head and neck as well as CTA of the head and neck.  Will also check labs.    Patient's workup is all relatively reassuring.  She has a mild bump in her CK of 364.  Her troponin is negative.  Magnesium is low normal.  Her CBC is normal.  CMP notable for sodium of 133, bicarb of 18 and anion gap of 17.  Glucose is slightly elevated at 169, I do not think this is DKA.  Her LFTs are normal.  CT of the head, neck and CT angio of the head and neck are reviewed and negative for any acute pathology.  When I reassessed the patient, she is feeling better, but she feels like her jaw is spasming.  She is able to open her mouth, but not completely open it.  She does not have any tenderness on palpation of her jaw and I have a low suspicion for jaw dislocation.  Her headache has resolved.  She still having some cramping in her extremities.  I going to  empirically give magnesium and Valium.    When patient was reassessed, she reports feeling much better.  Jaw spasm is completely resolved.  She is able to open her mouth and speak and swallow without difficulty.  She feels normal right now.  I do not know what was going on with the patient earlier to cause all her symptoms.  It could have been due to her overworking herself and not eating and drinking enough.  Regardless, I do not think there is anything emergent or dangerous going on at this time and I am comfortable with discharge home with close outpatient follow-up.  I gave a small prescription for Valium to use as needed for muscle cramps.  We discussed other over-the-counter treatment options.  I recommended close outpatient follow-up.  I instructed her to drink plenty fluids and make sure she is eating some food.  Additional reassurance anticipatory guidance discussed.  Return precautions discussed as well        New Prescriptions    DIAZEPAM (VALIUM) 5 MG TABLET    Take 1 tablet (5 mg) by mouth every 6 hours as needed for anxiety.       Final diagnoses:   Muscle cramping   Neck pain   Acute nonintractable headache, unspecified headache type       6/17/2025   [facility] EMERGENCY DEPT       Michael Miller MD  06/18/25 [id number]

## 2025-06-18 NOTE — ED TRIAGE NOTES
Found by  who called 911 for altered mental status. Pt was working in garden today and c/o all over body pain worse in neck. Worse headache of her life. Pt goes in and out of holding a conversation.

## 2025-06-19 LAB
ATRIAL RATE - MUSE: 88 BPM
DIASTOLIC BLOOD PRESSURE - MUSE: NORMAL MMHG
INTERPRETATION ECG - MUSE: NORMAL
P AXIS - MUSE: 76 DEGREES
PR INTERVAL - MUSE: 136 MS
QRS DURATION - MUSE: 84 MS
QT - MUSE: 400 MS
QTC - MUSE: 484 MS
R AXIS - MUSE: 79 DEGREES
SYSTOLIC BLOOD PRESSURE - MUSE: NORMAL MMHG
T AXIS - MUSE: 63 DEGREES
VENTRICULAR RATE- MUSE: 88 BPM

## 2025-06-25 ENCOUNTER — HOSPITAL ENCOUNTER (OUTPATIENT)
Dept: GENERAL RADIOLOGY | Facility: CLINIC | Age: 70
Discharge: HOME | End: 2025-06-25
Attending: SURGERY
Payer: MEDICARE

## 2025-06-25 DIAGNOSIS — M54.9 BACK PAIN: ICD-10-CM

## 2025-06-25 PROCEDURE — 72110 X-RAY EXAM L-2 SPINE 4/>VWS: CPT

## 2025-06-25 NOTE — TELEPHONE ENCOUNTER
NEUROSURGERY - NEW PREVISIT PLANNING    Referring Provider: Anil Bettencourt  Affiliated with: [facility]   OVN 5/16/2025   Reason For Visit: Back pain [M54.9]   DIRECT REFERRAL: NA     IMAGING STATUS/LOCATION DATE/TYPE   MRI EXTERNAL/ PACS 4/15/2025  MRI LUMBAR SPINE    CT NA    XRAY ORDERED/ PENDING  SENT TO [facility]/ PENDING    XR LUMBAR SPINE 4 VIEWS   NOTES     Other specialist OVN: NA    EMG NA    INJECTION NA    PHYSICAL THERAPY NA    SURGERY INTERNAL/ SURGERY  10/6/2020- LUMBAR 4-5 BILATERAL DECOMPRESSION Lumbar 5- Sacral 1 bilateral lateral decompression.      C2C ON FILE YR LAST UPDATE 5YR & OLDER NEEDS UPDATE   YES  []  NO  [x] NONE ACTION TAKEN: NEEDS TO COMPLETE C2C AT APPT

## 2025-07-01 ENCOUNTER — OFFICE VISIT (OUTPATIENT)
Dept: NEUROSURGERY | Facility: CLINIC | Age: 70
End: 2025-07-01
Attending: STUDENT IN AN ORGANIZED HEALTH CARE EDUCATION/TRAINING PROGRAM
Payer: MEDICARE

## 2025-07-01 ENCOUNTER — PRE VISIT (OUTPATIENT)
Dept: NEUROSURGERY | Facility: CLINIC | Age: 70
End: 2025-07-01

## 2025-07-01 ENCOUNTER — PREP FOR PROCEDURE (OUTPATIENT)
Dept: NEUROLOGY | Facility: CLINIC | Age: 70
End: 2025-07-01

## 2025-07-01 ENCOUNTER — TELEPHONE (OUTPATIENT)
Dept: NEUROSURGERY | Facility: CLINIC | Age: 70
End: 2025-07-01

## 2025-07-01 VITALS
DIASTOLIC BLOOD PRESSURE: 57 MMHG | HEART RATE: 88 BPM | BODY MASS INDEX: 25.5 KG/M2 | WEIGHT: 126.5 LBS | OXYGEN SATURATION: 97 % | SYSTOLIC BLOOD PRESSURE: 121 MMHG | HEIGHT: 59 IN

## 2025-07-01 DIAGNOSIS — M54.16 LUMBAR RADICULOPATHY: Primary | ICD-10-CM

## 2025-07-01 DIAGNOSIS — M51.26 LUMBAR DISC HERNIATION: Primary | ICD-10-CM

## 2025-07-01 PROCEDURE — 3078F DIAST BP <80 MM HG: CPT | Performed by: SURGERY

## 2025-07-01 PROCEDURE — 1125F AMNT PAIN NOTED PAIN PRSNT: CPT | Performed by: SURGERY

## 2025-07-01 PROCEDURE — 3074F SYST BP LT 130 MM HG: CPT | Performed by: SURGERY

## 2025-07-01 PROCEDURE — 99203 OFFICE O/P NEW LOW 30 MIN: CPT | Performed by: SURGERY

## 2025-07-01 ASSESSMENT — PAIN SCALES - GENERAL: PAINLEVEL_OUTOF10: MODERATE PAIN (5)

## 2025-07-01 NOTE — LETTER
7/1/2025      Shante Carrington  [address]  [place] [address]      Dear Colleague,    Thank you for referring your patient, Shante Carrington, to the [facility]. Please see a copy of my visit note below.    NEUROSURGERY CONSULTATION NOTE      Neurosurgery was asked to see this patient by Anil Bettencourt MD for evaluation of lumbar disc herniation.       CONSULTATION ASSESSMENT AND PLAN:    70 yo female with prior lumbar 4-sacral 1 decompression with Dr Silva at [facility] who presents with recurrent left lumbar 5 radiculopathy.  Current radicular symptoms is likely due to a disc bulge at the 4-5 level, causing nerve compression. Previous conservative treatments, including physical therapy, medications and injections. Consideration of a left lumbar 4-lumbar 5 redo hemilaminectomy, medial facetectomy, foraminotomy and microdiscectomy to relieve nerve compression. Conservative management remains an option although previous attempts have not been successful.      Risks and benefits of surgery discussed including but not limited to infection, hematoma, nerve damage including paralysis, post op radiculitis, durotomy, inadequate symptom relief, recurrent disc herniation or lumbar disease, risks associated with the use of general anesthesia. We also discussed if disc recurs again would recommend lumbar fusion.     No further conservative care is indicated and the surgery is medically necessary for the following reasons:further physical therapy is not indicated in this case as it would only prolong the patient s suffering without providing any benefit and the delay would increase the risk of neurologic decline and/or symptoms worsening unnecessarily, with no benefit to the patient and possible harm.    There are no untreated, underlying mental health conditions (including but not limited to psychological conditions or drug or alcohol abuse) that will preclude an appropriate recovery from  this surgery or that are contraindications to proceeding with surgery.     Maida Molina MD      HISTORY OF PRESENTING ILLNESS:    69-year-old female, has been experiencing left leg pain that originates from her back, travels through her buttock, and extends down the side of her leg to her knee. This pain has persisted for two years, following her knee surgery. She reports that the pain is not accompanied by numbness or tingling in the legs, although she sometimes experiences neuropathy in her feet, which is not in the same pattern as the leg pain. Initially, after her back surgery, she had right sciatic pain that resulted in neuropathy in her right foot, but this has worsened recently. She underwent a laminectomy at lumbar levels 4-5 and lumbar 5-sacral 1 in 2020 with Dr Silva at [facility]. A recent scan revealed severe degenerative changes in her back, prompting further investigation. She received a cortisone injection at the lumbar 4-5 level, which provided relief. She has also tried physical therapy without long term relief. Melanie has discontinued gabapentin due to its side effects, including fogginess and confusion. [facility], but gardening exacerbates it. She has not experienced weakness in her legs, nor any bowel or bladder loss. Melanie has also been hospitalized for diverticulitis, which has contributed to her current mental state.       Past Medical History:   Diagnosis Date     Hypothyroidism      Need for prophylactic hormone replacement therapy (postmenopausal)     estrogen and progesterone     PONV (postoperative nausea and vomiting)      Prediabetes      Rosacea      Spinal stenosis        Past Surgical History:   Procedure Laterality Date     DECOMPRESSION LUMBAR MINIMALLY INVASIVE TWO LEVELS N/A 10/6/2020    Procedure: Lumbar 4-5 bilateral decompression, Lumbar 5- Sacral 1 bilateral lateral decompression.;  Surgeon: Gary Silva  MD Juliano;  Location: [facility] OR     DILATION AND CURETTAGE, HYSTEROSCOPY DIAGNOSTIC, COMBINED N/A 9/26/2022    Procedure: HYSTEROSCOPY, DIAGNOSTIC, WITH fractional DILATION AND CURETTAGE OF UTERUS;  Surgeon: Rio Fernando MD;  Location: [facility] OR     DILATION AND CURETTAGE, OPERATIVE HYSTEROSCOPY, COMBINED N/A 8/1/2016    Procedure: COMBINED DILATION AND CURETTAGE, OPERATIVE HYSTEROSCOPY;  Surgeon: Rio Fernando MD;  Location: [facility] OR     ENT SURGERY         REVIEW OF SYSTEMS:  See ROS form under media     MEDICATIONS:    Current Outpatient Medications   Medication Sig Dispense Refill     acetaminophen (TYLENOL) 325 MG tablet Take 3 tablets (975 mg) by mouth every 6 hours as needed for mild pain 50 tablet 0     citalopram (CELEXA) 40 MG tablet Take 20 mg by mouth every evening        diazepam (VALIUM) 5 MG tablet Take 1 tablet (5 mg) by mouth every 6 hours as needed for anxiety. 10 tablet 0     ESTRADIOL PO Take 1 mg by mouth every evening        gabapentin (NEURONTIN) 300 MG capsule Take 600 mg by mouth At Bedtime        ibuprofen (ADVIL/MOTRIN) 200 MG tablet Take 4 tablets (800 mg) by mouth every 6 hours as needed for other (mild and/or inflammatory pain)       levothyroxine (SYNTHROID, LEVOTHROID) 50 MCG tablet Take 50 mcg by mouth daily        Omega-3 Fatty Acids (OMEGA-3 FISH OIL PO)        OTHER MEDICAL SUPPLIES At Bedtime CPAP       progesterone (PROMETRIUM) 100 MG capsule Take 100 mg by mouth At Bedtime        traMADol (ULTRAM) 50 MG tablet Take 50 mg by mouth every 6 hours as needed for severe pain       Ascorbic Acid (VITAMIN C PO) Take 500 mg by mouth       calcium carbonate (OS-DOUG 500 MG [place]. [place]) 500 MG tablet Take 500 mg by mouth 2 times daily       cetirizine (ZYRTEC) 10 MG tablet Take 10 mg by mouth daily       Cholecalciferol (VITAMIN D PO) Take 2,000 Units by mouth daily        Cyanocobalamin (B-12) 1000 MCG TBCR        glucosamine-chondroitin 500-400 MG CAPS per capsule Take 1 capsule by  mouth daily       meclizine (ANTIVERT) 25 MG tablet Take 1 tablet (25 mg) by mouth 3 times daily as needed for dizziness 90 tablet 0     omeprazole (PRILOSEC) 20 MG capsule Take 20 mg by mouth every evening       traZODone (DESYREL) 100 MG tablet Take 2 tablets by mouth At Bedtime           ALLERGIES/SENSITIVITIES:     Allergies   Allergen Reactions     Percocet [Oxycodone-Acetaminophen] Other (See Comments)     hallucinations     Zofran [Ondansetron] Other (See Comments)     headache     Venlafaxine Anxiety and Other (See Comments)     MIND RACING     Zolpidem Anxiety     Interacted with antidepressant and REM sleep       PERTINENT SOCIAL HISTORY:   Social History     Socioeconomic History     Marital status:      Spouse name: None     Number of children: None     Years of education: None     Highest education level: None   Tobacco Use     Smoking status: Never     Smokeless tobacco: Never   Vaping Use     Vaping status: Never Used   Substance and Sexual Activity     Alcohol use: Yes     Comment: rarely     Drug use: No     Social Drivers of Health     Financial Resource Strain: Low Risk  (6/4/2024)    Received from [facility]    Financial Resource Strain      Difficulty of Paying Living Expenses: 3   Food Insecurity: No Food Insecurity (10/24/2024)    Received from [facility]    Food Insecurity      Do you worry your food will run out before you are able to buy more?: 1   Transportation Needs: No Transportation Needs (10/24/2024)    Received from [facility]    Transportation Needs      Does lack of transportation keep you from medical appointments?: 1      Does lack of transportation keep you from work, meetings or getting things that you need?: 1   Social Connections: Socially Integrated (10/24/2024)    Received from [facility]    Social Connections      Do you often feel  "lonely or isolated from those around you?: 0   Housing Stability: Low Risk  (10/24/2024)    Received from [facility]    Housing Stability      What is your housing situation today?: 1         FAMILY HISTORY:  No family history on file.     PHYSICAL EXAM:   Constitutional: /57   Pulse 88   Ht 1.499 m (4' 11\")   Wt 57.4 kg (126 lb 8 oz)   SpO2 97%   BMI 25.55 kg/m          Mental Status: A & O in no acute distress.  Affect is appropriate.  Speech is fluent.  Recent and remote memory are intact.  Attention span and concentration are normal.     Motor:  Normal bulk and tone all muscle groups of upper and lower extremities.    Strength: 5/5 in LE     Sensory: Sensation intact bilaterally to light touch in LE except numbness round left knee incision      Coordination; toe gait intact. Heel gait difficult on left.  Normal gait and station.     Reflexes;knee/ ankle jerk 2+ except left patellar absent      IMAGING:  I personally reviewed all radiographic images           Cc:   Pricilla Hilario             Again, thank you for allowing me to participate in the care of your patient.        Sincerely,        Maida Molina MD    Electronically signed"

## 2025-07-01 NOTE — TELEPHONE ENCOUNTER
Scheduled surgery with: SURGEONS NAME: DR. MOLINA    Spoke with:  PATIENT       OTHER:   N/A    Date of Surgery:   11/3/2025    Estimated Arrival time Discussed with Patient:    Yes    Location of surgery:    [facility]     Pre-Op H&P:    YES- notified patient to complete pre-op within 30 days, preferably 1 week prior to surgery date.       OTHER :     N/A    Post-Op Appts:    YES-  nurse visit, 6 week & 3 months    OTHER:     N/A     Does patient need Images prior:  No -     If yes- what is patient getting done: N/A    Discussed with patient pre-op RN will call 2-3 days prior to surgery with arrival time and instructions:  Yes     Did patient receive surgery folder : Yes    Method/Via: Received in clinic by RN     Surgical soap: Receiving via Received in clinic by RN       Additional Comments:  Contacted patient and schedule surgery for 11/3/2025 w/ Dr. Molina.   Discussed surgery details and post-op appointments patient verbalized understanding.       All patients questions were answered and patient was instructed to review surgical packet and call back with any questions or concerns.       Claudia Rollins 7/1/2025 11:15 AM

## 2025-07-01 NOTE — PATIENT INSTRUCTIONS
Current symptoms include left leg pain, which is likely due to a disc bulge at the 4-5 level, causing nerve compression. Previous conservative treatments, including physical therapy, medications and injections. Consideration of a redo discectomy on the left side at lumbar 4-5 to relieve nerve compression. Conservative management remains an option although previous attempts have not been successful.      Risks and benefits of surgery discussed including but not limited to infection, hematoma, nerve damage including paralysis, post op radiculitis, durotomy, inadequate symptom relief, recurrent disc herniation or lumbar disease, risks associated with the use of general anesthesia.     Physical therapy starts 6 weeks after surgery to help with recovery. Generally an outpatient surgery. Lifting restrictions for 3 months after surgery.       Please review COMPLETE information about your proposed surgery, pre-operative requirements, post-operative course and expectations - available in a folder provided to you in clinic!    Pre-Operative    Pre-operative physical / Lab work with primary care physician within 30 days of surgical date. We prefer the pre-op exam to be done 2 weeks prior to surgery.    If all pre-op appointments/test are not completed prior to your surgery date, you will be asked to reschedule your surgery.           As part of preparation for your upcoming procedure your primary care doctor may order a test to rule out a COVID-19 infection. This is no longer a requirement prior to surgery.     Readiness Visits    Prior to surgery, you may have a telephone or in person readiness visit with our RN team to discuss your upcomming surgery, results of your pre-op physical, and lab work.   If you will require a collar/neck brace after surgery, you will be fitted for one at your readiness visit prior to surgery (scheduled by the surgery scheduler).     Shower procedure    Hibiclens shower: Use one packet the night  before surgery and one packet the morning of surgery for a whole body shower. Avoid face, hair, and genitals.      Eating/Drinking    Stop all solid foods 8 hours before surgery.  Stop all clear liquids 2 hours prior to arrival time     Clear liquids include water, clear juice, black coffee, or clear tea without milk, Gatorade, clear soda.     Medications - please refer to the pre-operative medication instructions sheet in your folder    Hold Aspirin, NSAIDs (Advil/Ibuprofen, Indocin, Naproxen,Nuprin,Relafen/Nabumetone, Diclofenac,Meloxicam, Aleve, Celebrex) and all vitamins and supplements x 7-10 days prior to surgical date  You can take Tylenol (Acetaminophen) for pain up until the date of your surgery   Do not exceed 3,000 mg per day   Any other medications prescribed, please discuss with your primary care provider at your pre-operative physical. Please discuss when/if it is safe for you to stop taking blood thinners with your primary care provider.   We will NOT provide pain medications prior to surgery. We will prescribe post-op pain medications for up to 6 weeks after surgery.       FMLA/Short-term disability    If you are currently employed, you will likely need to be off work for 4-6 weeks for post-op recovery and healing.  Please fax any FMLA/short term disability paperwork to [phone], mail it into the clinic, drop it off in person, or send via a [employer] message.   You may also call our clinic with the date in which you'd like to return to work, and we can provide a work letter to your employer  We will support Short-Term Disability up to 12 weeks, beginning the date of your surgery. We do NOT support Long-Term Disability/Social Security Benefits.     Pain Management after surgery    Dr. Molina will refill pain medications for up to 6 weeks after the date of surgery. If you still need pain medication at that point you will have to go through your primary care provider.    Dealing with pain    As your  body heals, you might feel a stabbing, burning, or aching pain. You may also have some numbness.  Everyone feels pain differently, we may ask you to rate your pain using a pain scale. This will let us know how much pain you feel.   Keep in mind that medicine won't take away all of your pain. It helps to try other ways to relax and ease pain.     Things to help with pain    After surgery, we will give you medicine for your pain. These medications work well, but they can make you drowsy, itchy, or sick to your stomach, and constipated. Try to take narcotics with food if they cause nausea.   For mild to moderate pain, you can take medication such as Tylenol or Ibuprofen. These can be used with narcotics and muscle relaxants. *If you have had a fusion: do NOT use NSAIDs for 6 months after surgery.   Do NOT drive while taking narcotic pain medication  Do NOT drink alcohol while using narcotic pain medication  You can utilize ice as needed (no longer than 20 minutes at one time) you may apply this over your covered incision  Utilize heat for muscle spasms, do not apply heat over your incision  If a muscle relaxer is prescribed, please do NOT take it at the same time as your narcotic pain medication. Take them at least 90 minutes apart.   You may also use pain cream/patches on sore muscles. Do NOT apply these on your incision. Patches may be cut in 1/2 if needed.     *After your surgery, if you will be staying in-patient, a nursing team will be monitoring you closely throughout your stay and communicate your health status to your surgeon and other providers.  You will be seen by Advanced Practice Providers (e.g., nurse practitioners, clinic nurse specialists, and physician assistants) who will check on you regularly to assess the status of your surgical recovery.     Incision Care    Look at your incision site every day. You  may need a mirror, or family member to help you.   Do not submerge your incision in water such as  pools, hot tubs, baths for at least 6 weeks or until incision is healed  You may get your incision wet in the shower. Allow water and soap to run over incision, and gently pat dry.   Remove the dressing the day after you are discharged from the hospital. Keep the incision clean and dry at all times. This may require several bandage changes.   Contact us right away if you have:   Fever over 101 degrees farenheit  Green or yellow drainage (pus) from your incision or increased bloody drainage   Redness, swelling, or warmth at your surgery site   Notify the clinic [phone].    Activity Restrictions    For the first 6 weeks, no lifting,pushing, or pulling > 5-10 pounds, no bending, twisting.  Use the stairs in moderation   Walking: Walking is the best way to start exercise after surgery. Take short frequent walks. You may gradually increase the distance as tolerated. If you feel pain, decrease your activity, but DO NOT stop walking. Walking will help you regain strength.  Avoid prolonged positioning for longer than 30 minutes (change positions frequently while awake)  No contact sports until after follow up visit  No high impact activities such as; running/jogging, snowmobile or 4 linares riding or any other recreational vehicles until deemed safe by your surgeon/care team.   Please call the clinic if you develop any of the following symptoms:  Swelling and/or warmth in one or both legs  Pain or tenderness in your leg, ankle, foot, or arm   Red or discolored/pale skin     Post-Op Follow Up Appointments    We will call you to schedule these appointments after your discharge from the hospital.   Incision assessment within 2 weeks with a Registered Nurse   6 week post-op with a Nurse Practitioner/Physician Assistant. Your surgeon will be available on this day.

## 2025-07-01 NOTE — NURSING NOTE
"Shante Carrington is a 69 year old female who presents for:  Chief Complaint   Patient presents with    Consult     Patient says she is really out of it, jhoan juarez. Patient has pain in the lower back, which runs down the left leg. Pain level 5.        Initial Vitals:  /57   Pulse 88   Ht 4' 11\" (1.499 m)   Wt 126 lb 8 oz (57.4 kg)   SpO2 97%   BMI 25.55 kg/m   Estimated body mass index is 25.55 kg/m  as calculated from the following:    Height as of this encounter: 4' 11\" (1.499 m).    Weight as of this encounter: 126 lb 8 oz (57.4 kg). Body surface area is 1.55 meters squared. BP completed using cuff size: regular  Moderate Pain (5)        Lore Blackwell    "

## 2025-07-01 NOTE — PROGRESS NOTES
NEUROSURGERY CONSULTATION NOTE      Neurosurgery was asked to see this patient by Anil Bettencourt MD for evaluation of lumbar disc herniation.       CONSULTATION ASSESSMENT AND PLAN:    68 yo female with prior lumbar 4-sacral 1 decompression with Dr Silva at [facility] who presents with recurrent left lumbar 5 radiculopathy.  Current radicular symptoms is likely due to a disc bulge at the 4-5 level, causing nerve compression. Previous conservative treatments, including physical therapy, medications and injections. Consideration of a left lumbar 4-lumbar 5 redo hemilaminectomy, medial facetectomy, foraminotomy and microdiscectomy to relieve nerve compression. Conservative management remains an option although previous attempts have not been successful.      Risks and benefits of surgery discussed including but not limited to infection, hematoma, nerve damage including paralysis, post op radiculitis, durotomy, inadequate symptom relief, recurrent disc herniation or lumbar disease, risks associated with the use of general anesthesia. We also discussed if disc recurs again would recommend lumbar fusion.     No further conservative care is indicated and the surgery is medically necessary for the following reasons:further physical therapy is not indicated in this case as it would only prolong the patient s suffering without providing any benefit and the delay would increase the risk of neurologic decline and/or symptoms worsening unnecessarily, with no benefit to the patient and possible harm.    There are no untreated, underlying mental health conditions (including but not limited to psychological conditions or drug or alcohol abuse) that will preclude an appropriate recovery from this surgery or that are contraindications to proceeding with surgery.     Maida Molina MD      HISTORY OF PRESENTING ILLNESS:    69-year-old female, has been experiencing left leg pain that originates from her back, travels through her  buttock, and extends down the side of her leg to her knee. This pain has persisted for two years, following her knee surgery. She reports that the pain is not accompanied by numbness or tingling in the legs, although she sometimes experiences neuropathy in her feet, which is not in the same pattern as the leg pain. Initially, after her back surgery, she had right sciatic pain that resulted in neuropathy in her right foot, but this has worsened recently. She underwent a laminectomy at lumbar levels 4-5 and lumbar 5-sacral 1 in 2020 with Dr Silva at [facility]. A recent scan revealed severe degenerative changes in her back, prompting further investigation. She received a cortisone injection at the lumbar 4-5 level, which provided relief. She has also tried physical therapy without long term relief. Melanie has discontinued gabapentin due to its side effects, including fogginess and confusion. [facility] pain temporarily, but gardening exacerbates it. She has not experienced weakness in her legs, nor any bowel or bladder loss. Melanie has also been hospitalized for diverticulitis, which has contributed to her current mental state.       Past Medical History:   Diagnosis Date    Hypothyroidism     Need for prophylactic hormone replacement therapy (postmenopausal)     estrogen and progesterone    PONV (postoperative nausea and vomiting)     Prediabetes     Rosacea     Spinal stenosis        Past Surgical History:   Procedure Laterality Date    DECOMPRESSION LUMBAR MINIMALLY INVASIVE TWO LEVELS N/A 10/6/2020    Procedure: Lumbar 4-5 bilateral decompression, Lumbar 5- Sacral 1 bilateral lateral decompression.;  Surgeon: Gary Silva MD;  Location: [facility] OR    DILATION AND CURETTAGE, HYSTEROSCOPY DIAGNOSTIC, COMBINED N/A 9/26/2022    Procedure: HYSTEROSCOPY, DIAGNOSTIC, WITH fractional DILATION AND CURETTAGE OF UTERUS;  Surgeon: Rio Fernando MD;  Location: [facility] OR     DILATION AND CURETTAGE, OPERATIVE HYSTEROSCOPY, COMBINED N/A 8/1/2016    Procedure: COMBINED DILATION AND CURETTAGE, OPERATIVE HYSTEROSCOPY;  Surgeon: Rio Fernando MD;  Location: [facility] OR    ENT SURGERY         REVIEW OF SYSTEMS:  See ROS form under media     MEDICATIONS:    Current Outpatient Medications   Medication Sig Dispense Refill    acetaminophen (TYLENOL) 325 MG tablet Take 3 tablets (975 mg) by mouth every 6 hours as needed for mild pain 50 tablet 0    citalopram (CELEXA) 40 MG tablet Take 20 mg by mouth every evening       diazepam (VALIUM) 5 MG tablet Take 1 tablet (5 mg) by mouth every 6 hours as needed for anxiety. 10 tablet 0    ESTRADIOL PO Take 1 mg by mouth every evening       gabapentin (NEURONTIN) 300 MG capsule Take 600 mg by mouth At Bedtime       ibuprofen (ADVIL/MOTRIN) 200 MG tablet Take 4 tablets (800 mg) by mouth every 6 hours as needed for other (mild and/or inflammatory pain)      levothyroxine (SYNTHROID, LEVOTHROID) 50 MCG tablet Take 50 mcg by mouth daily       Omega-3 Fatty Acids (OMEGA-3 FISH OIL PO)       OTHER MEDICAL SUPPLIES At Bedtime CPAP      progesterone (PROMETRIUM) 100 MG capsule Take 100 mg by mouth At Bedtime       traMADol (ULTRAM) 50 MG tablet Take 50 mg by mouth every 6 hours as needed for severe pain      Ascorbic Acid (VITAMIN C PO) Take 500 mg by mouth      calcium carbonate (OS-DOUG 500 MG [place]. [place]) 500 MG tablet Take 500 mg by mouth 2 times daily      cetirizine (ZYRTEC) 10 MG tablet Take 10 mg by mouth daily      Cholecalciferol (VITAMIN D PO) Take 2,000 Units by mouth daily       Cyanocobalamin (B-12) 1000 MCG TBCR       glucosamine-chondroitin 500-400 MG CAPS per capsule Take 1 capsule by mouth daily      meclizine (ANTIVERT) 25 MG tablet Take 1 tablet (25 mg) by mouth 3 times daily as needed for dizziness 90 tablet 0    omeprazole (PRILOSEC) 20 MG capsule Take 20 mg by mouth every evening      traZODone (DESYREL) 100 MG tablet Take 2 tablets by mouth At  Bedtime           ALLERGIES/SENSITIVITIES:     Allergies   Allergen Reactions    Percocet [Oxycodone-Acetaminophen] Other (See Comments)     hallucinations    Zofran [Ondansetron] Other (See Comments)     headache    Venlafaxine Anxiety and Other (See Comments)     MIND RACING    Zolpidem Anxiety     Interacted with antidepressant and REM sleep       PERTINENT SOCIAL HISTORY:   Social History     Socioeconomic History    Marital status:      Spouse name: None    Number of children: None    Years of education: None    Highest education level: None   Tobacco Use    Smoking status: Never    Smokeless tobacco: Never   Vaping Use    Vaping status: Never Used   Substance and Sexual Activity    Alcohol use: Yes     Comment: rarely    Drug use: No     Social Drivers of Health     Financial Resource Strain: Low Risk  (6/4/2024)    Received from [employer]    Financial Resource Strain     Difficulty of Paying Living Expenses: 3   Food Insecurity: No Food Insecurity (10/24/2024)    Received from [employer]    Food Insecurity     Do you worry your food will run out before you are able to buy more?: 1   Transportation Needs: No Transportation Needs (10/24/2024)    Received from [employer]    Transportation Needs     Does lack of transportation keep you from medical appointments?: 1     Does lack of transportation keep you from work, meetings or getting things that you need?: 1   Social Connections: Socially Integrated (10/24/2024)    Received from [employer]    Social Connections     Do you often feel lonely or isolated from those around you?: 0   Housing Stability: Low Risk  (10/24/2024)    Received from [employer]    Housing Stability     What is your housing situation today?: 1         FAMILY HISTORY:  No family history on file.     PHYSICAL EXAM:  "  Constitutional: /57   Pulse 88   Ht 1.499 m (4' 11\")   Wt 57.4 kg (126 lb 8 oz)   SpO2 97%   BMI 25.55 kg/m          Mental Status: A & O in no acute distress.  Affect is appropriate.  Speech is fluent.  Recent and remote memory are intact.  Attention span and concentration are normal.     Motor:  Normal bulk and tone all muscle groups of upper and lower extremities.    Strength: 5/5 in LE     Sensory: Sensation intact bilaterally to light touch in LE except numbness round left knee incision      Coordination; toe gait intact. Heel gait difficult on left.  Normal gait and station.     Reflexes;knee/ ankle jerk 2+ except left patellar absent      IMAGING:  I personally reviewed all radiographic images           Cc:   Pricilla Hilario           "

## (undated) DEVICE — GLOVE PROTEXIS W/NEU-THERA 7.5  2D73TE75

## (undated) DEVICE — PREP CHLORAPREP 26ML TINTED ORANGE  260815

## (undated) DEVICE — Device

## (undated) DEVICE — GOWN XLG DISP 9545

## (undated) DEVICE — PAD FLOOR SURGISAFE

## (undated) DEVICE — CATH INTERMITTENT CLEAN-CATH FEMALE 14FR 6" VINYL LF 420614

## (undated) DEVICE — LUBRICATING JELLY 4.25OZ

## (undated) DEVICE — STOCKING SLEEVE COMPRESSION CALF MED

## (undated) DEVICE — DRAPE MAYO STAND 23X54 8337

## (undated) DEVICE — SOL NACL 0.9% IRRIG 1000ML BOTTLE 2F7124

## (undated) DEVICE — ADH FLOSEAL W/HUMAN THROMBIN 5ML

## (undated) DEVICE — DECANTER VIAL 2006S

## (undated) DEVICE — BASIN SET MINOR DISP

## (undated) DEVICE — DRAPE MICRO ZEISS MD 48"X120CM

## (undated) DEVICE — PAD PERI INDIV WRAP 11" 2022

## (undated) DEVICE — LABEL MEDICATION SYSTEM  3304

## (undated) DEVICE — SPONGE SURGIFOAM 100 1974

## (undated) DEVICE — SOL NACL 0.9% IRRIG 1000ML BOTTLE 07138-09

## (undated) DEVICE — TUBING CYSTO/BLADDER IRRIG SET 80" 06544-01

## (undated) DEVICE — SOL ADH LIQUID BENZOIN SWAB 0.6ML C1544

## (undated) DEVICE — SPONGE COTTONOID 1/2X1/2" 20-04SW

## (undated) DEVICE — SPONGE KITTNER 31001010

## (undated) DEVICE — NDL SPINAL 18GA 3.5" 405184

## (undated) DEVICE — TUBING SUCTION 12"X1/4" N612

## (undated) DEVICE — ESU CORD BIPOLAR GREEN 10-4000

## (undated) DEVICE — PREP CHLORHEXIDINE 4% 4OZ (HIBICLENS) 57504

## (undated) DEVICE — KIT SEALER DURASEAL EXACT SP HYDROGEL 5ML SGL USE 20-6520

## (undated) DEVICE — DRAPE STERI TOWEL SM 1000

## (undated) DEVICE — SOL WATER IRRIG 1000ML BOTTLE 07139-09

## (undated) DEVICE — DRAPE C-ARM 60X42" 1013

## (undated) DEVICE — CATH TRAY FOLEY SURESTEP 16FR DRAIN BAG STATOCK A899916

## (undated) DEVICE — ADH FLOSEAL W/HUMAN THROMBIN 5ML W/APPLICATOR TIP ADS201844

## (undated) DEVICE — SU VICRYL 1 CTB-1 36" UND JB947

## (undated) DEVICE — SUCTION MANIFOLD NEPTUNE 2 SYS 1 PORT 702-025-000

## (undated) DEVICE — GLOVE PROTEXIS BLUE W/NEU-THERA 8.0  2D73EB80

## (undated) DEVICE — DRSG PRIMAPORE 03 1/8X6" 66000318

## (undated) DEVICE — SU VICRYL 0 CT-2 27" UND J270H

## (undated) DEVICE — ESU ELEC BLADE 2.75" COATED/INSULATED E1455

## (undated) DEVICE — SUCTION TIP YANKAUER STR K87

## (undated) DEVICE — SOL NACL 0.9% IRRIG 3000ML BAG 07972-08

## (undated) DEVICE — PACK LAPAROSCOPY/PELVISCOPY STD

## (undated) DEVICE — NDL SPINAL 22GA 3.5" QUINCKE 405181

## (undated) DEVICE — BONE WAX 2.5GM W31G

## (undated) DEVICE — DRSG TELFA 3X8" 1238

## (undated) DEVICE — GOWN IMPERVIOUS SPECIALTY XLG/XLONG 32474

## (undated) DEVICE — KIT PROCEDURE FLUENT IN/OUT FLOWPAK TISS TRAP FLT-112S

## (undated) DEVICE — MIDAS REX DISSECTING TOOL  14MH30

## (undated) DEVICE — SU MONOCRYL 3-0 PS-2 18" UND Y497G

## (undated) DEVICE — SU VICRYL 2-0 OS-6 27" UND J533H

## (undated) RX ORDER — FENTANYL CITRATE 50 UG/ML
INJECTION, SOLUTION INTRAMUSCULAR; INTRAVENOUS
Status: DISPENSED
Start: 2020-10-06

## (undated) RX ORDER — PROPOFOL 10 MG/ML
INJECTION, EMULSION INTRAVENOUS
Status: DISPENSED
Start: 2020-10-06

## (undated) RX ORDER — ONDANSETRON 2 MG/ML
INJECTION INTRAMUSCULAR; INTRAVENOUS
Status: DISPENSED
Start: 2022-09-26

## (undated) RX ORDER — GABAPENTIN 300 MG/1
CAPSULE ORAL
Status: DISPENSED
Start: 2020-10-06

## (undated) RX ORDER — CEFAZOLIN SODIUM/WATER 2 G/20 ML
SYRINGE (ML) INTRAVENOUS
Status: DISPENSED
Start: 2022-09-26

## (undated) RX ORDER — PROPOFOL 10 MG/ML
INJECTION, EMULSION INTRAVENOUS
Status: DISPENSED
Start: 2022-09-26

## (undated) RX ORDER — ONDANSETRON 2 MG/ML
INJECTION INTRAMUSCULAR; INTRAVENOUS
Status: DISPENSED
Start: 2020-10-06

## (undated) RX ORDER — DEXAMETHASONE SODIUM PHOSPHATE 4 MG/ML
INJECTION, SOLUTION INTRA-ARTICULAR; INTRALESIONAL; INTRAMUSCULAR; INTRAVENOUS; SOFT TISSUE
Status: DISPENSED
Start: 2020-10-06

## (undated) RX ORDER — FENTANYL CITRATE 50 UG/ML
INJECTION, SOLUTION INTRAMUSCULAR; INTRAVENOUS
Status: DISPENSED
Start: 2022-09-26

## (undated) RX ORDER — LIDOCAINE HYDROCHLORIDE 10 MG/ML
INJECTION, SOLUTION EPIDURAL; INFILTRATION; INTRACAUDAL; PERINEURAL
Status: DISPENSED
Start: 2020-10-06

## (undated) RX ORDER — CEFAZOLIN SODIUM 2 G/100ML
INJECTION, SOLUTION INTRAVENOUS
Status: DISPENSED
Start: 2020-10-06

## (undated) RX ORDER — SCOLOPAMINE TRANSDERMAL SYSTEM 1 MG/1
PATCH, EXTENDED RELEASE TRANSDERMAL
Status: DISPENSED
Start: 2020-10-06

## (undated) RX ORDER — BUPIVACAINE HYDROCHLORIDE 5 MG/ML
INJECTION, SOLUTION EPIDURAL; INTRACAUDAL
Status: DISPENSED
Start: 2022-09-26

## (undated) RX ORDER — ACETAMINOPHEN 325 MG/1
TABLET ORAL
Status: DISPENSED
Start: 2022-09-26

## (undated) RX ORDER — ACETAMINOPHEN 325 MG/1
TABLET ORAL
Status: DISPENSED
Start: 2020-10-06

## (undated) RX ORDER — CELECOXIB 200 MG/1
CAPSULE ORAL
Status: DISPENSED
Start: 2020-10-06

## (undated) RX ORDER — GABAPENTIN 300 MG/1
CAPSULE ORAL
Status: DISPENSED
Start: 2022-09-26

## (undated) RX ORDER — GLYCOPYRROLATE 0.2 MG/ML
INJECTION, SOLUTION INTRAMUSCULAR; INTRAVENOUS
Status: DISPENSED
Start: 2020-10-06

## (undated) RX ORDER — CEFAZOLIN SODIUM 1 G/3ML
INJECTION, POWDER, FOR SOLUTION INTRAMUSCULAR; INTRAVENOUS
Status: DISPENSED
Start: 2020-10-06

## (undated) RX ORDER — KETOROLAC TROMETHAMINE 30 MG/ML
INJECTION, SOLUTION INTRAMUSCULAR; INTRAVENOUS
Status: DISPENSED
Start: 2020-10-06

## (undated) RX ORDER — DEXAMETHASONE SODIUM PHOSPHATE 4 MG/ML
INJECTION, SOLUTION INTRA-ARTICULAR; INTRALESIONAL; INTRAMUSCULAR; INTRAVENOUS; SOFT TISSUE
Status: DISPENSED
Start: 2022-09-26

## (undated) RX ORDER — BUPIVACAINE HYDROCHLORIDE 2.5 MG/ML
INJECTION, SOLUTION INFILTRATION; PERINEURAL
Status: DISPENSED
Start: 2020-10-06

## (undated) RX ORDER — MAGNESIUM SULFATE HEPTAHYDRATE 40 MG/ML
INJECTION, SOLUTION INTRAVENOUS
Status: DISPENSED
Start: 2020-10-06